# Patient Record
Sex: FEMALE | Race: BLACK OR AFRICAN AMERICAN | NOT HISPANIC OR LATINO | URBAN - METROPOLITAN AREA
[De-identification: names, ages, dates, MRNs, and addresses within clinical notes are randomized per-mention and may not be internally consistent; named-entity substitution may affect disease eponyms.]

---

## 2019-08-21 ENCOUNTER — OUTPATIENT (OUTPATIENT)
Dept: OUTPATIENT SERVICES | Facility: HOSPITAL | Age: 49
LOS: 1 days | Discharge: HOME | End: 2019-08-21
Payer: OTHER MISCELLANEOUS

## 2019-08-21 VITALS
WEIGHT: 203.93 LBS | TEMPERATURE: 98 F | SYSTOLIC BLOOD PRESSURE: 121 MMHG | HEART RATE: 79 BPM | DIASTOLIC BLOOD PRESSURE: 73 MMHG | RESPIRATION RATE: 16 BRPM | OXYGEN SATURATION: 98 % | HEIGHT: 69 IN

## 2019-08-21 DIAGNOSIS — Z01.818 ENCOUNTER FOR OTHER PREPROCEDURAL EXAMINATION: ICD-10-CM

## 2019-08-21 DIAGNOSIS — Z98.890 OTHER SPECIFIED POSTPROCEDURAL STATES: Chronic | ICD-10-CM

## 2019-08-21 DIAGNOSIS — M16.0 BILATERAL PRIMARY OSTEOARTHRITIS OF HIP: ICD-10-CM

## 2019-08-21 LAB
ANION GAP SERPL CALC-SCNC: 8 MMOL/L — SIGNIFICANT CHANGE UP (ref 5–17)
APTT BLD: 28.6 SEC — SIGNIFICANT CHANGE UP (ref 27.5–36.3)
BASOPHILS # BLD AUTO: 0.02 K/UL — SIGNIFICANT CHANGE UP (ref 0–0.2)
BASOPHILS NFR BLD AUTO: 0.3 % — SIGNIFICANT CHANGE UP (ref 0–2)
BUN SERPL-MCNC: 7 MG/DL — SIGNIFICANT CHANGE UP (ref 7–23)
CALCIUM SERPL-MCNC: 8.9 MG/DL — SIGNIFICANT CHANGE UP (ref 8.5–10.1)
CHLORIDE SERPL-SCNC: 107 MMOL/L — SIGNIFICANT CHANGE UP (ref 96–108)
CO2 SERPL-SCNC: 27 MMOL/L — SIGNIFICANT CHANGE UP (ref 22–31)
CREAT SERPL-MCNC: 0.89 MG/DL — SIGNIFICANT CHANGE UP (ref 0.5–1.3)
EOSINOPHIL # BLD AUTO: 0.11 K/UL — SIGNIFICANT CHANGE UP (ref 0–0.5)
EOSINOPHIL NFR BLD AUTO: 1.6 % — SIGNIFICANT CHANGE UP (ref 0–6)
GLUCOSE SERPL-MCNC: 93 MG/DL — SIGNIFICANT CHANGE UP (ref 70–99)
HBA1C BLD-MCNC: 5.8 % — HIGH (ref 4–5.6)
HCG UR QL: NEGATIVE — SIGNIFICANT CHANGE UP
HCT VFR BLD CALC: 43.3 % — SIGNIFICANT CHANGE UP (ref 34.5–45)
HGB BLD-MCNC: 13.6 G/DL — SIGNIFICANT CHANGE UP (ref 11.5–15.5)
IMM GRANULOCYTES NFR BLD AUTO: 0.3 % — SIGNIFICANT CHANGE UP (ref 0–1.5)
LYMPHOCYTES # BLD AUTO: 2.16 K/UL — SIGNIFICANT CHANGE UP (ref 1–3.3)
LYMPHOCYTES # BLD AUTO: 31.7 % — SIGNIFICANT CHANGE UP (ref 13–44)
MCHC RBC-ENTMCNC: 25.9 PG — LOW (ref 27–34)
MCHC RBC-ENTMCNC: 31.4 GM/DL — LOW (ref 32–36)
MCV RBC AUTO: 82.3 FL — SIGNIFICANT CHANGE UP (ref 80–100)
MONOCYTES # BLD AUTO: 0.49 K/UL — SIGNIFICANT CHANGE UP (ref 0–0.9)
MONOCYTES NFR BLD AUTO: 7.2 % — SIGNIFICANT CHANGE UP (ref 2–14)
MRSA PCR RESULT.: SIGNIFICANT CHANGE UP
NEUTROPHILS # BLD AUTO: 4.01 K/UL — SIGNIFICANT CHANGE UP (ref 1.8–7.4)
NEUTROPHILS NFR BLD AUTO: 58.9 % — SIGNIFICANT CHANGE UP (ref 43–77)
NRBC # BLD: 0 /100 WBCS — SIGNIFICANT CHANGE UP (ref 0–0)
PLATELET # BLD AUTO: 256 K/UL — SIGNIFICANT CHANGE UP (ref 150–400)
POTASSIUM SERPL-MCNC: 3.7 MMOL/L — SIGNIFICANT CHANGE UP (ref 3.5–5.3)
POTASSIUM SERPL-SCNC: 3.7 MMOL/L — SIGNIFICANT CHANGE UP (ref 3.5–5.3)
RBC # BLD: 5.26 M/UL — HIGH (ref 3.8–5.2)
RBC # FLD: 15.2 % — HIGH (ref 10.3–14.5)
S AUREUS DNA NOSE QL NAA+PROBE: SIGNIFICANT CHANGE UP
SODIUM SERPL-SCNC: 142 MMOL/L — SIGNIFICANT CHANGE UP (ref 135–145)
WBC # BLD: 6.81 K/UL — SIGNIFICANT CHANGE UP (ref 3.8–10.5)
WBC # FLD AUTO: 6.81 K/UL — SIGNIFICANT CHANGE UP (ref 3.8–10.5)

## 2019-08-21 PROCEDURE — 93010 ELECTROCARDIOGRAM REPORT: CPT

## 2019-08-21 NOTE — H&P PST ADULT - NSANTHOSAYNRD_GEN_A_CORE
No. YADIEL screening performed.  STOP BANG Legend: 0-2 = LOW Risk; 3-4 = INTERMEDIATE Risk; 5-8 = HIGH Risk

## 2019-08-21 NOTE — H&P PST ADULT - NSICDXPROBLEM_GEN_ALL_CORE_FT
PROBLEM DIAGNOSES  Problem: Osteoarthritis  Assessment and Plan: scheduled for left hip arthroplasty    Problem: COPD, mild  Assessment and Plan: continue meds    Problem: Bipolar disorder  Assessment and Plan: continue meds

## 2019-08-21 NOTE — H&P PST ADULT - ATTENDING COMMENTS
23-May-2017 00:08
Pt has advanced OA on X-ray.  They have failed all forms of conservative treatment including PT and Meds and Injections as necessary.  They are her for Total Joint Replacement.

## 2019-08-21 NOTE — H&P PST ADULT - ASSESSMENT
osteoarthritis left hip  CAPRINI SCORE    AGE RELATED RISK FACTORS                                                       MOBILITY RELATED FACTORS  [ x] Age 41-60 years                                            (1 Point)                  [ ] Bed rest                                                        (1 Point)  [ ] Age: 61-74 years                                           (2 Points)                [ ] Plaster cast                                                   (2 Points)  [ ] Age= 75 years                                              (3 Points)                 [ ] Bed bound for more than 72 hours                   (2 Points)    DISEASE RELATED RISK FACTORS                                               GENDER SPECIFIC FACTORS  [ ] Edema in the lower extremities                       (1 Point)                  [ ] Pregnancy                                                     (1 Point)  [ ] Varicose veins                                               (1 Point)                  [ ] Post-partum < 6 weeks                                   (1 Point)             [x ] BMI > 25 Kg/m2                                            (1 Point)                  [ ] Hormonal therapy  or oral contraception            (1 Point)                 [ ] Sepsis (in the previous month)                        (1 Point)                  [ ] History of pregnancy complications  [ ] Pneumonia or serious lung disease                                               [ ] Unexplained or recurrent                       (1 Point)           (in the previous month)                               (1 Point)  [ ] Abnormal pulmonary function test                     (1 Point)                 SURGERY RELATED RISK FACTORS  [ ] Acute myocardial infarction                              (1 Point)                 [ ]  Section                                            (1 Point)  [ ] Congestive heart failure (in the previous month)  (1 Point)                 [ ] Minor surgery                                                 (1 Point)   [ ] Inflammatory bowel disease                             (1 Point)                 [ ] Arthroscopic surgery                                        (2 Points)  [ ] Central venous access                                    (2 Points)                [ ] General surgery lasting more than 45 minutes   (2 Points)       [ ] Stroke (in the previous month)                          (5 Points)               [x ] Elective arthroplasty                                        (5 Points)                                                                                                                                               HEMATOLOGY RELATED FACTORS                                                 TRAUMA RELATED RISK FACTORS  [ ] Prior episodes of VTE                                     (3 Points)                 [ ] Fracture of the hip, pelvis, or leg                       (5 Points)  [ ] Positive family history for VTE                         (3 Points)                 [ ] Acute spinal cord injury (in the previous month)  (5 Points)  [ ] Prothrombin 99030 A                                      (3 Points)                 [ ] Paralysis  (less than 1 month)                          (5 Points)  [ ] Factor V Leiden                                             (3 Points)                 [ ] Multiple Trauma within 1 month                         (5 Points)  [ ] Lupus anticoagulants                                     (3 Points)                                                           [ ] Anticardiolipin antibodies                                (3 Points)                                                       [ ] High homocysteine in the blood                      (3 Points)                                             [ ] Other congenital or acquired thrombophilia       (3 Points)                                                [ ] Heparin induced thrombocytopenia                  (3 Points)                                          Total Score [      7    ]

## 2019-08-21 NOTE — PHYSICAL THERAPY INITIAL EVALUATION ADULT - ADDITIONAL COMMENTS
Pt lives with her parents (both of which are retired, + assist post-op) in a private home, 4 entry steps (+ B/L rails to enter, close enough to reach simultaneously), 15 steps inside (+ L rail for ascent). Pt has a tub/shower combo, no grab bars, fixe shower head, & standard toilet seat height. Pt has 3:1 commode at home that fits in bathroom (she currently uses it). Pt performs all functional mobility independently including community ambulation. Pt occasionally uses straight cane as needed. Pt has rolling walker, rollator, SAC x 2, & 3:1 commode --> all are in good working condition & easily accessible. Pt states pain is 7/10 at all times. Pt avoids wearing socks & shoes due to difficulty with ADL's but endorses she completes them independently. Pt takes muscle relaxant for pain management. Pt is right hand dominant, drives, & is a retired . Pt s/p R CLARIBEL in 2016.

## 2019-08-21 NOTE — H&P PST ADULT - HISTORY OF PRESENT ILLNESS
49 yo female PMH- bipolar, gerd - c/o left hip pain secondary to osteoarthritis scheduled for left hip arthroplasty 49 yo female PMH- bipolar, gerd - c/o left hip pain secondary to osteoarthritis scheduled for left hip arthroplasty. goal is to be able to travel to visit her family

## 2019-08-22 DIAGNOSIS — J44.9 CHRONIC OBSTRUCTIVE PULMONARY DISEASE, UNSPECIFIED: ICD-10-CM

## 2019-08-22 DIAGNOSIS — M19.90 UNSPECIFIED OSTEOARTHRITIS, UNSPECIFIED SITE: ICD-10-CM

## 2019-08-22 DIAGNOSIS — F31.9 BIPOLAR DISORDER, UNSPECIFIED: ICD-10-CM

## 2019-09-10 ENCOUNTER — TRANSCRIPTION ENCOUNTER (OUTPATIENT)
Age: 49
End: 2019-09-10

## 2019-09-11 ENCOUNTER — RESULT REVIEW (OUTPATIENT)
Age: 49
End: 2019-09-11

## 2019-09-11 ENCOUNTER — TRANSCRIPTION ENCOUNTER (OUTPATIENT)
Age: 49
End: 2019-09-11

## 2019-09-11 ENCOUNTER — INPATIENT (INPATIENT)
Facility: HOSPITAL | Age: 49
LOS: 0 days | Discharge: HOME HEALTH | End: 2019-09-12
Attending: ORTHOPAEDIC SURGERY | Admitting: ORTHOPAEDIC SURGERY
Payer: OTHER MISCELLANEOUS

## 2019-09-11 VITALS
OXYGEN SATURATION: 97 % | DIASTOLIC BLOOD PRESSURE: 75 MMHG | TEMPERATURE: 98 F | RESPIRATION RATE: 18 BRPM | SYSTOLIC BLOOD PRESSURE: 134 MMHG | HEART RATE: 81 BPM | HEIGHT: 69 IN | WEIGHT: 203.93 LBS

## 2019-09-11 DIAGNOSIS — Z98.890 OTHER SPECIFIED POSTPROCEDURAL STATES: Chronic | ICD-10-CM

## 2019-09-11 LAB
ANION GAP SERPL CALC-SCNC: 5 MMOL/L — SIGNIFICANT CHANGE UP (ref 5–17)
BUN SERPL-MCNC: 11 MG/DL — SIGNIFICANT CHANGE UP (ref 7–23)
CALCIUM SERPL-MCNC: 8.3 MG/DL — LOW (ref 8.5–10.1)
CHLORIDE SERPL-SCNC: 108 MMOL/L — SIGNIFICANT CHANGE UP (ref 96–108)
CO2 SERPL-SCNC: 26 MMOL/L — SIGNIFICANT CHANGE UP (ref 22–31)
CREAT SERPL-MCNC: 0.88 MG/DL — SIGNIFICANT CHANGE UP (ref 0.5–1.3)
GLUCOSE SERPL-MCNC: 102 MG/DL — HIGH (ref 70–99)
HCG UR QL: NEGATIVE — SIGNIFICANT CHANGE UP
HCT VFR BLD CALC: 39.1 % — SIGNIFICANT CHANGE UP (ref 34.5–45)
HGB BLD-MCNC: 12.4 G/DL — SIGNIFICANT CHANGE UP (ref 11.5–15.5)
MCHC RBC-ENTMCNC: 26.3 PG — LOW (ref 27–34)
MCHC RBC-ENTMCNC: 31.7 GM/DL — LOW (ref 32–36)
MCV RBC AUTO: 83 FL — SIGNIFICANT CHANGE UP (ref 80–100)
NRBC # BLD: 0 /100 WBCS — SIGNIFICANT CHANGE UP (ref 0–0)
PLATELET # BLD AUTO: 238 K/UL — SIGNIFICANT CHANGE UP (ref 150–400)
POTASSIUM SERPL-MCNC: 4.7 MMOL/L — SIGNIFICANT CHANGE UP (ref 3.5–5.3)
POTASSIUM SERPL-SCNC: 4.7 MMOL/L — SIGNIFICANT CHANGE UP (ref 3.5–5.3)
RBC # BLD: 4.71 M/UL — SIGNIFICANT CHANGE UP (ref 3.8–5.2)
RBC # FLD: 15.1 % — HIGH (ref 10.3–14.5)
SODIUM SERPL-SCNC: 139 MMOL/L — SIGNIFICANT CHANGE UP (ref 135–145)
WBC # BLD: 11.52 K/UL — HIGH (ref 3.8–10.5)
WBC # FLD AUTO: 11.52 K/UL — HIGH (ref 3.8–10.5)

## 2019-09-11 PROCEDURE — 72170 X-RAY EXAM OF PELVIS: CPT | Mod: 26

## 2019-09-11 PROCEDURE — 88305 TISSUE EXAM BY PATHOLOGIST: CPT | Mod: 26

## 2019-09-11 PROCEDURE — 88311 DECALCIFY TISSUE: CPT | Mod: 26

## 2019-09-11 RX ORDER — ALBUTEROL 90 UG/1
2 AEROSOL, METERED ORAL EVERY 6 HOURS
Refills: 0 | Status: DISCONTINUED | OUTPATIENT
Start: 2019-09-11 | End: 2019-09-12

## 2019-09-11 RX ORDER — HYDROMORPHONE HYDROCHLORIDE 2 MG/ML
4 INJECTION INTRAMUSCULAR; INTRAVENOUS; SUBCUTANEOUS
Refills: 0 | Status: DISCONTINUED | OUTPATIENT
Start: 2019-09-11 | End: 2019-09-12

## 2019-09-11 RX ORDER — PANTOPRAZOLE SODIUM 20 MG/1
40 TABLET, DELAYED RELEASE ORAL
Refills: 0 | Status: DISCONTINUED | OUTPATIENT
Start: 2019-09-11 | End: 2019-09-12

## 2019-09-11 RX ORDER — CLONAZEPAM 1 MG
1 TABLET ORAL
Qty: 0 | Refills: 0 | DISCHARGE

## 2019-09-11 RX ORDER — ARIPIPRAZOLE 15 MG/1
1 TABLET ORAL
Qty: 0 | Refills: 0 | DISCHARGE

## 2019-09-11 RX ORDER — CELECOXIB 200 MG/1
200 CAPSULE ORAL
Refills: 0 | Status: DISCONTINUED | OUTPATIENT
Start: 2019-09-12 | End: 2019-09-12

## 2019-09-11 RX ORDER — FOLIC ACID 0.8 MG
1 TABLET ORAL DAILY
Refills: 0 | Status: DISCONTINUED | OUTPATIENT
Start: 2019-09-11 | End: 2019-09-12

## 2019-09-11 RX ORDER — FENTANYL CITRATE 50 UG/ML
25 INJECTION INTRAVENOUS
Refills: 0 | Status: DISCONTINUED | OUTPATIENT
Start: 2019-09-11 | End: 2019-09-11

## 2019-09-11 RX ORDER — ARIPIPRAZOLE 15 MG/1
2 TABLET ORAL DAILY
Refills: 0 | Status: DISCONTINUED | OUTPATIENT
Start: 2019-09-11 | End: 2019-09-12

## 2019-09-11 RX ORDER — FENTANYL CITRATE 50 UG/ML
50 INJECTION INTRAVENOUS
Refills: 0 | Status: DISCONTINUED | OUTPATIENT
Start: 2019-09-11 | End: 2019-09-11

## 2019-09-11 RX ORDER — BUDESONIDE AND FORMOTEROL FUMARATE DIHYDRATE 160; 4.5 UG/1; UG/1
2 AEROSOL RESPIRATORY (INHALATION)
Qty: 0 | Refills: 0 | DISCHARGE

## 2019-09-11 RX ORDER — ASPIRIN/CALCIUM CARB/MAGNESIUM 324 MG
325 TABLET ORAL
Refills: 0 | Status: DISCONTINUED | OUTPATIENT
Start: 2019-09-12 | End: 2019-09-12

## 2019-09-11 RX ORDER — POLYETHYLENE GLYCOL 3350 17 G/17G
17 POWDER, FOR SOLUTION ORAL DAILY
Refills: 0 | Status: DISCONTINUED | OUTPATIENT
Start: 2019-09-11 | End: 2019-09-12

## 2019-09-11 RX ORDER — FERROUS SULFATE 325(65) MG
325 TABLET ORAL
Refills: 0 | Status: DISCONTINUED | OUTPATIENT
Start: 2019-09-11 | End: 2019-09-12

## 2019-09-11 RX ORDER — SODIUM CHLORIDE 9 MG/ML
1000 INJECTION, SOLUTION INTRAVENOUS
Refills: 0 | Status: DISCONTINUED | OUTPATIENT
Start: 2019-09-11 | End: 2019-09-11

## 2019-09-11 RX ORDER — HYDROMORPHONE HYDROCHLORIDE 2 MG/ML
2 INJECTION INTRAMUSCULAR; INTRAVENOUS; SUBCUTANEOUS EVERY 4 HOURS
Refills: 0 | Status: DISCONTINUED | OUTPATIENT
Start: 2019-09-11 | End: 2019-09-11

## 2019-09-11 RX ORDER — ACETAMINOPHEN 500 MG
650 TABLET ORAL ONCE
Refills: 0 | Status: COMPLETED | OUTPATIENT
Start: 2019-09-11 | End: 2019-09-11

## 2019-09-11 RX ORDER — SENNA PLUS 8.6 MG/1
2 TABLET ORAL AT BEDTIME
Refills: 0 | Status: DISCONTINUED | OUTPATIENT
Start: 2019-09-11 | End: 2019-09-12

## 2019-09-11 RX ORDER — LAMOTRIGINE 25 MG/1
0 TABLET, ORALLY DISINTEGRATING ORAL
Qty: 0 | Refills: 0 | DISCHARGE

## 2019-09-11 RX ORDER — CEFAZOLIN SODIUM 1 G
2000 VIAL (EA) INJECTION EVERY 8 HOURS
Refills: 0 | Status: COMPLETED | OUTPATIENT
Start: 2019-09-11 | End: 2019-09-11

## 2019-09-11 RX ORDER — BUPROPION HYDROCHLORIDE 150 MG/1
1 TABLET, EXTENDED RELEASE ORAL
Qty: 0 | Refills: 0 | DISCHARGE

## 2019-09-11 RX ORDER — DOCUSATE SODIUM 100 MG
100 CAPSULE ORAL THREE TIMES A DAY
Refills: 0 | Status: DISCONTINUED | OUTPATIENT
Start: 2019-09-11 | End: 2019-09-12

## 2019-09-11 RX ORDER — BUDESONIDE AND FORMOTEROL FUMARATE DIHYDRATE 160; 4.5 UG/1; UG/1
2 AEROSOL RESPIRATORY (INHALATION)
Refills: 0 | Status: DISCONTINUED | OUTPATIENT
Start: 2019-09-11 | End: 2019-09-12

## 2019-09-11 RX ORDER — LAMOTRIGINE 25 MG/1
1 TABLET, ORALLY DISINTEGRATING ORAL
Qty: 0 | Refills: 0 | DISCHARGE

## 2019-09-11 RX ORDER — TRANEXAMIC ACID 100 MG/ML
1000 INJECTION, SOLUTION INTRAVENOUS ONCE
Refills: 0 | Status: COMPLETED | OUTPATIENT
Start: 2019-09-11 | End: 2019-09-11

## 2019-09-11 RX ORDER — HYDROMORPHONE HYDROCHLORIDE 2 MG/ML
2 INJECTION INTRAMUSCULAR; INTRAVENOUS; SUBCUTANEOUS
Refills: 0 | Status: DISCONTINUED | OUTPATIENT
Start: 2019-09-11 | End: 2019-09-12

## 2019-09-11 RX ORDER — CLONAZEPAM 1 MG
0.5 TABLET ORAL AT BEDTIME
Refills: 0 | Status: DISCONTINUED | OUTPATIENT
Start: 2019-09-11 | End: 2019-09-12

## 2019-09-11 RX ORDER — ZOLPIDEM TARTRATE 10 MG/1
5 TABLET ORAL AT BEDTIME
Refills: 0 | Status: DISCONTINUED | OUTPATIENT
Start: 2019-09-11 | End: 2019-09-12

## 2019-09-11 RX ORDER — DEXAMETHASONE 0.5 MG/5ML
10 ELIXIR ORAL ONCE
Refills: 0 | Status: COMPLETED | OUTPATIENT
Start: 2019-09-12 | End: 2019-09-12

## 2019-09-11 RX ORDER — SODIUM CHLORIDE 9 MG/ML
1000 INJECTION, SOLUTION INTRAVENOUS
Refills: 0 | Status: DISCONTINUED | OUTPATIENT
Start: 2019-09-11 | End: 2019-09-12

## 2019-09-11 RX ORDER — ASCORBIC ACID 60 MG
500 TABLET,CHEWABLE ORAL
Refills: 0 | Status: DISCONTINUED | OUTPATIENT
Start: 2019-09-11 | End: 2019-09-12

## 2019-09-11 RX ORDER — HYDROMORPHONE HYDROCHLORIDE 2 MG/ML
4 INJECTION INTRAMUSCULAR; INTRAVENOUS; SUBCUTANEOUS EVERY 4 HOURS
Refills: 0 | Status: DISCONTINUED | OUTPATIENT
Start: 2019-09-11 | End: 2019-09-11

## 2019-09-11 RX ORDER — ACETAMINOPHEN 500 MG
975 TABLET ORAL EVERY 8 HOURS
Refills: 0 | Status: DISCONTINUED | OUTPATIENT
Start: 2019-09-11 | End: 2019-09-12

## 2019-09-11 RX ORDER — LAMOTRIGINE 25 MG/1
25 TABLET, ORALLY DISINTEGRATING ORAL AT BEDTIME
Refills: 0 | Status: DISCONTINUED | OUTPATIENT
Start: 2019-09-11 | End: 2019-09-12

## 2019-09-11 RX ORDER — OXYCODONE HYDROCHLORIDE 5 MG/1
10 TABLET ORAL ONCE
Refills: 0 | Status: DISCONTINUED | OUTPATIENT
Start: 2019-09-11 | End: 2019-09-11

## 2019-09-11 RX ORDER — ALBUTEROL 90 UG/1
1.25 AEROSOL, METERED ORAL ONCE
Refills: 0 | Status: COMPLETED | OUTPATIENT
Start: 2019-09-11 | End: 2019-09-11

## 2019-09-11 RX ORDER — BUPROPION HYDROCHLORIDE 150 MG/1
300 TABLET, EXTENDED RELEASE ORAL DAILY
Refills: 0 | Status: DISCONTINUED | OUTPATIENT
Start: 2019-09-11 | End: 2019-09-12

## 2019-09-11 RX ORDER — ALBUTEROL 90 UG/1
2 AEROSOL, METERED ORAL
Qty: 0 | Refills: 0 | DISCHARGE

## 2019-09-11 RX ORDER — HYDROMORPHONE HYDROCHLORIDE 2 MG/ML
1 INJECTION INTRAMUSCULAR; INTRAVENOUS; SUBCUTANEOUS EVERY 4 HOURS
Refills: 0 | Status: DISCONTINUED | OUTPATIENT
Start: 2019-09-11 | End: 2019-09-12

## 2019-09-11 RX ORDER — ONDANSETRON 8 MG/1
4 TABLET, FILM COATED ORAL EVERY 6 HOURS
Refills: 0 | Status: DISCONTINUED | OUTPATIENT
Start: 2019-09-11 | End: 2019-09-12

## 2019-09-11 RX ADMIN — HYDROMORPHONE HYDROCHLORIDE 4 MILLIGRAM(S): 2 INJECTION INTRAMUSCULAR; INTRAVENOUS; SUBCUTANEOUS at 17:05

## 2019-09-11 RX ADMIN — HYDROMORPHONE HYDROCHLORIDE 1 MILLIGRAM(S): 2 INJECTION INTRAMUSCULAR; INTRAVENOUS; SUBCUTANEOUS at 14:18

## 2019-09-11 RX ADMIN — Medication 325 MILLIGRAM(S): at 11:48

## 2019-09-11 RX ADMIN — HYDROMORPHONE HYDROCHLORIDE 1 MILLIGRAM(S): 2 INJECTION INTRAMUSCULAR; INTRAVENOUS; SUBCUTANEOUS at 20:01

## 2019-09-11 RX ADMIN — Medication 100 MILLIGRAM(S): at 15:32

## 2019-09-11 RX ADMIN — TRANEXAMIC ACID 220 MILLIGRAM(S): 100 INJECTION, SOLUTION INTRAVENOUS at 09:37

## 2019-09-11 RX ADMIN — HYDROMORPHONE HYDROCHLORIDE 4 MILLIGRAM(S): 2 INJECTION INTRAMUSCULAR; INTRAVENOUS; SUBCUTANEOUS at 11:48

## 2019-09-11 RX ADMIN — ALBUTEROL 1.25 MILLIGRAM(S): 90 AEROSOL, METERED ORAL at 09:36

## 2019-09-11 RX ADMIN — Medication 650 MILLIGRAM(S): at 07:09

## 2019-09-11 RX ADMIN — Medication 975 MILLIGRAM(S): at 22:20

## 2019-09-11 RX ADMIN — SODIUM CHLORIDE 100 MILLILITER(S): 9 INJECTION, SOLUTION INTRAVENOUS at 09:37

## 2019-09-11 RX ADMIN — Medication 100 MILLIGRAM(S): at 14:18

## 2019-09-11 RX ADMIN — LAMOTRIGINE 25 MILLIGRAM(S): 25 TABLET, ORALLY DISINTEGRATING ORAL at 22:27

## 2019-09-11 RX ADMIN — BUPROPION HYDROCHLORIDE 300 MILLIGRAM(S): 150 TABLET, EXTENDED RELEASE ORAL at 11:48

## 2019-09-11 RX ADMIN — SODIUM CHLORIDE 150 MILLILITER(S): 9 INJECTION, SOLUTION INTRAVENOUS at 11:00

## 2019-09-11 RX ADMIN — Medication 1 MILLIGRAM(S): at 11:48

## 2019-09-11 RX ADMIN — POLYETHYLENE GLYCOL 3350 17 GRAM(S): 17 POWDER, FOR SOLUTION ORAL at 11:48

## 2019-09-11 RX ADMIN — Medication 325 MILLIGRAM(S): at 17:05

## 2019-09-11 RX ADMIN — ARIPIPRAZOLE 2 MILLIGRAM(S): 15 TABLET ORAL at 11:48

## 2019-09-11 RX ADMIN — SODIUM CHLORIDE 150 MILLILITER(S): 9 INJECTION, SOLUTION INTRAVENOUS at 19:55

## 2019-09-11 RX ADMIN — Medication 975 MILLIGRAM(S): at 15:18

## 2019-09-11 RX ADMIN — HYDROMORPHONE HYDROCHLORIDE 1 MILLIGRAM(S): 2 INJECTION INTRAMUSCULAR; INTRAVENOUS; SUBCUTANEOUS at 14:33

## 2019-09-11 RX ADMIN — Medication 100 MILLIGRAM(S): at 21:23

## 2019-09-11 RX ADMIN — Medication 0.5 MILLIGRAM(S): at 21:23

## 2019-09-11 RX ADMIN — HYDROMORPHONE HYDROCHLORIDE 4 MILLIGRAM(S): 2 INJECTION INTRAMUSCULAR; INTRAVENOUS; SUBCUTANEOUS at 12:48

## 2019-09-11 RX ADMIN — Medication 975 MILLIGRAM(S): at 21:23

## 2019-09-11 RX ADMIN — Medication 1 TABLET(S): at 11:48

## 2019-09-11 RX ADMIN — HYDROMORPHONE HYDROCHLORIDE 1 MILLIGRAM(S): 2 INJECTION INTRAMUSCULAR; INTRAVENOUS; SUBCUTANEOUS at 20:16

## 2019-09-11 RX ADMIN — BUDESONIDE AND FORMOTEROL FUMARATE DIHYDRATE 2 PUFF(S): 160; 4.5 AEROSOL RESPIRATORY (INHALATION) at 17:07

## 2019-09-11 RX ADMIN — Medication 975 MILLIGRAM(S): at 14:20

## 2019-09-11 RX ADMIN — Medication 100 MILLIGRAM(S): at 23:36

## 2019-09-11 RX ADMIN — Medication 500 MILLIGRAM(S): at 17:05

## 2019-09-11 RX ADMIN — HYDROMORPHONE HYDROCHLORIDE 4 MILLIGRAM(S): 2 INJECTION INTRAMUSCULAR; INTRAVENOUS; SUBCUTANEOUS at 18:05

## 2019-09-11 NOTE — OCCUPATIONAL THERAPY INITIAL EVALUATION ADULT - STRENGTHENING, PT EVAL
Patient will increase left hip flexion/extension to WFL to increase performance with ADL's while following hip precautions in 1-2 weeks.

## 2019-09-11 NOTE — CONSULT NOTE ADULT - SUBJECTIVE AND OBJECTIVE BOX
JOHNATHON VEGAS is a 49y Female s/p LEFT TOTAL HIP ARTHROPLASTY  by Dr. Jeffrey on 9/11/10. complains of postop pain; patient tolerated surgery well.    PMH:  w/ h/o Asthma  COPD, mild  Anxiety and depression  Bipolar disorder  Chronic GERD    ROS: no fevers, chills, headache, dizziness, lightheadedness, chest pain, palpitations, shortness of breath, cough, phlegm, wheezing, abdominal pain, nausea, vomiting, diarrhea, constipation or urinary symptoms     History of arthroplasty of right hip    SH: does not smoke or drink at this time    ALLERGIES:  IV Contrast (Short breath)  Motrin (Other)  oxycodone (Pruritus)  penicillin (Unknown)  Zithromax (Short breath)    MEDS:  acetaminophen   Tablet .. 975 milliGRAM(s) Oral every 8 hours  ALBUTerol    90 MICROgram(s) HFA Inhaler 2 Puff(s) Inhalation every 6 hours PRN  aluminum hydroxide/magnesium hydroxide/simethicone Suspension 30 milliLiter(s) Oral four times a day PRN  ARIPiprazole 2 milliGRAM(s) Oral daily  ascorbic acid 500 milliGRAM(s) Oral two times a day  buDESOnide  80 MICROgram(s)/formoterol 4.5 MICROgram(s) Inhaler 2 Puff(s) Inhalation two times a day  buPROPion XL . 300 milliGRAM(s) Oral daily  ceFAZolin   IVPB 2000 milliGRAM(s) IV Intermittent every 8 hours  clonazePAM  Tablet 0.5 milliGRAM(s) Oral at bedtime  docusate sodium 100 milliGRAM(s) Oral three times a day  ferrous    sulfate 325 milliGRAM(s) Oral three times a day with meals  folic acid 1 milliGRAM(s) Oral daily  HYDROmorphone   Tablet 2 milliGRAM(s) Oral every 3 hours PRN  HYDROmorphone   Tablet 4 milliGRAM(s) Oral every 3 hours PRN  HYDROmorphone  Injectable 1 milliGRAM(s) IV Push every 4 hours PRN  lactated ringers. 1000 milliLiter(s) IV Continuous <Continuous>  lamoTRIgine 25 milliGRAM(s) Oral at bedtime  multivitamin 1 Tablet(s) Oral daily  ondansetron Injectable 4 milliGRAM(s) IV Push every 6 hours PRN  pantoprazole    Tablet 40 milliGRAM(s) Oral before breakfast  polyethylene glycol 3350 17 Gram(s) Oral daily  senna 2 Tablet(s) Oral at bedtime PRN  zolpidem 5 milliGRAM(s) Oral at bedtime PRN  zolpidem 5 milliGRAM(s) Oral at bedtime PRN    PHYS:  T(C): 36.4 (09-11-19 @ 18:09), Max: 36.9 (09-11-19 @ 06:49)  HR: 69 (09-11-19 @ 18:09) (57 - 89)  BP: 120/58 (09-11-19 @ 18:09) (101/54 - 151/73)  RR: 16 (09-11-19 @ 18:09) (12 - 20)  SpO2: 98% (09-11-19 @ 18:09) (97% - 100%)  HEENT unremarkable  neck no JVD or bruit  lungs, clear bilaterally  heart, regular rhythm, normal S1, S2, no murmurs, rubs or gallops  abdomen, soft, non tender, no organomegaly, normal bowel sounds  no cyanosis, clubbing, edema or calf tenderness  neuro, grossly normal              12.4   11.52 )-----------( 238      ( 11 Sep 2019 09:44 )             39.1   09-11    139  |  108  |  11  ----------------------------<  102<H>  4.7   |  26  |  0.88    Ca    8.3<L>      11 Sep 2019 09:45    Assessment and Plan: status post left total hip replacement; Gastroesophageal reflux disease; postop leucocytosis; chronic obstructive pulmonary disease; asthma; depression/anxiety; bipolar disorder; pain control; deep vein thrombophlebitis prophylaxis; physical therapy; bowel regimen; nutrition support; follow up labs; will follow.

## 2019-09-11 NOTE — OCCUPATIONAL THERAPY INITIAL EVALUATION ADULT - NS ASR OT EQUIP NEEDS DISCH
patient reports she has rolling walker & 3:1 commode. Pt was provided a hip kit at bedside, patient reports she has rolling walker & 3:1 commode. Pt was provided a hip kit at bedside.

## 2019-09-11 NOTE — DISCHARGE NOTE PROVIDER - NSDCFUADDAPPT_GEN_ALL_CORE_FT
Follow up with your surgeon in two weeks. Call for appointment.  If you need more pain medication, call your surgeon's office. For medication refills or authorizations, please call [phone]  Call and schedule a follow up appointment with your primary care physician for repeat blood work (CBC and BMP) for post hospital discharge follow-up care.  Call your surgeon if you have increased redness/pain/drainage or fever. Return to ER for shortness of breath/calf tenderness.  Per Dr. Jeffrey: may advance from walker as tolerated per discretion of physical therapist.

## 2019-09-11 NOTE — PHYSICAL THERAPY INITIAL EVALUATION ADULT - ADDITIONAL COMMENTS
Confirmed post-op: Pt lives with her parents (both of which are retired, + assist post-op) in a private home, 4 entry steps (+ B/L rails to enter, close enough to reach simultaneously), 15 steps inside (+ L rail for ascent). Pt has a tub/shower combo, no grab bars, fixe shower head, & standard toilet seat height. Pt has 3:1 commode at home that fits in bathroom (she currently uses it). Pt performs all functional mobility independently including community ambulation. Pt occasionally uses straight cane as needed. Pt has rolling walker, rollator, SAC x 2, & 3:1 commode --> all are in good working condition & easily accessible.

## 2019-09-11 NOTE — PHYSICAL THERAPY INITIAL EVALUATION ADULT - PASSIVE RANGE OF MOTION EXAMINATION, REHAB EVAL
except no L hip flexion past 90, no left hip adduction past neutral, no left hip internal rotation secondary to hip replacement precautions/no Passive ROM deficits were identified

## 2019-09-11 NOTE — PATIENT PROFILE ADULT - NSPROCHRONICPAINAGGRAVATE_GEN_A_NUR
activity/movement
Airway patent, Nasal mucosa clear. Mouth with normal mucosa. Throat has no vesicles, no oropharyngeal exudates and uvula is midline.

## 2019-09-11 NOTE — OCCUPATIONAL THERAPY INITIAL EVALUATION ADULT - ADDITIONAL COMMENTS
Preop confirmed-Pt lives with her parents (both of which are retired, + assist post-op) in a private home, 4 entry steps (+ B/L rails to enter, close enough to reach simultaneously), 15 steps inside (+ L rail for ascent). Pt has a tub/shower combo, no grab bars, fixe shower head, & standard toilet seat height. Pt has 3:1 commode at home that fits in bathroom (she currently uses it). Pt performs all functional mobility independently including community ambulation. Pt occasionally uses straight cane as needed. Pt has rolling walker, rollator, SAC x 2, & 3:1 commode --> all are in good working condition & easily accessible.

## 2019-09-11 NOTE — DISCHARGE NOTE PROVIDER - CARE PROVIDER_API CALL
Jose Alberto Jeffrey)  Orthopaedic Surgery  [address] [place] [address]  Phone: [phone]  Fax: [phone]  Follow Up Time:

## 2019-09-11 NOTE — DISCHARGE NOTE NURSING/CASE MANAGEMENT/SOCIAL WORK - PATIENT PORTAL LINK FT
You can access the FollowMyHealth Patient Portal offered by [facility] by registering at the following website: [email or web address]. By joining [facility]’s FollowMyHealth portal, you will also be able to view your health information using other applications (apps) compatible with our system.

## 2019-09-11 NOTE — DISCHARGE NOTE PROVIDER - HOSPITAL COURSE
49yFemale with history of left hip osteoarthritis presenting for left total hip arthroplasty Dr. Jose Alberto Jeffrey on 9/11/2019. Risk and benefits of surgery were explained to the patient.The patient understood and agreed to proceed with surgery. Patient underwent the procedure with no intraoperative complications. Pt was brought in stable condition to the PACU. Once stable in PACU, pt was brought to the floor. During hospital stay pt was followed by Medicine, PT/OT and home care during this admission. Pt had an uneventful hospital course. Pt is stable for discharge to home on POD#1

## 2019-09-11 NOTE — PHYSICAL THERAPY INITIAL EVALUATION ADULT - GAIT TRAINING, PT EVAL
Pt will independently ambulate 200feet with rolling walker without loss of balance, by 2-3days. Pt will independently negotiate 6 steps with L ascending handrail step to pattern to enter and exit home, by 2-3 days

## 2019-09-11 NOTE — DISCHARGE NOTE PROVIDER - NSDCACTIVITY_GEN_ALL_CORE
Showering allowed/Bathing allowed/Do not drive or operate machinery/Walking - Outdoors allowed/Stairs allowed/Do not make important decisions/Walking - Indoors allowed/No heavy lifting/straining

## 2019-09-11 NOTE — DISCHARGE NOTE PROVIDER - NSDCCPCAREPLAN_GEN_ALL_CORE_FT
PRINCIPAL DISCHARGE DIAGNOSIS  Diagnosis: Primary localized osteoarthritis of left hip  Assessment and Plan of Treatment:

## 2019-09-11 NOTE — OCCUPATIONAL THERAPY INITIAL EVALUATION ADULT - RANGE OF MOTION EXAMINATION, LOWER EXTREMITY
Right LE Active ROM was WFL   (within functional limits)/LLE hip precautions, decreased left hip flexion AROM grossly

## 2019-09-11 NOTE — PROGRESS NOTE ADULT - SUBJECTIVE AND OBJECTIVE BOX
Patient is seen and examined at bedside. Denies CP/SOB/Dizziness/N/V/D/HA. Pain is not controlled.     Vital Signs Last 24 Hrs  T(C): 36.6 (11 Sep 2019 14:16), Max: 36.9 (11 Sep 2019 06:49)  T(F): 97.8 (11 Sep 2019 14:16), Max: 98.5 (11 Sep 2019 11:45)  HR: 74 (11 Sep 2019 14:02) (57 - 89)  BP: 116/63 (11 Sep 2019 14:02) (101/54 - 151/73)  BP(mean): --  RR: 16 (11 Sep 2019 14:02) (12 - 20)  SpO2: 98% (11 Sep 2019 14:02) (97% - 100%)    GEN: NAD  ABD: soft, NT/ND; no rebound or guarding;  Neurologic: AAOx3; CNS grossly intact; no focal deficits  LLE: Prineo Dressing with scant sanguinous drainage. abduction pillow in place  B/L LE's: Motor intact + EHL/FHL/TA/GS in the BL LE. Sensation is grossly intact B/L. Extremities warm B/L. Compartments soft, compressible B/L, no calf tenderness B/L. DP 2+ B/L.    Labs:                          12.4   11.52 )-----------( 238      ( 11 Sep 2019 09:44 )             39.1       09-11    139  |  108  |  11  ----------------------------<  102<H>  4.7   |  26  |  0.88    Ca    8.3<L>      11 Sep 2019 09:45        A/P: Patient is a 49y y/o Female s/p left total hip arthroplasty, POD # 0  -wound care, isometric exercises, GI motility, new medications, hip precautions,  hospital course and discharge planning reviewed with pt  -Pain control/analgesia: Dilaudid 2/4mg trial.  -Inc spirometry reviewed and counseled  -Venodynes/foot pumps  -F/U AM Labs  -PT/OT/WBAT  -Antibiotic per scips  -Anticoagulation: ASA 325mg BID  -Medical consult: Dr Celestin  -HARINDER plan: Home tomorrow

## 2019-09-11 NOTE — OCCUPATIONAL THERAPY INITIAL EVALUATION ADULT - MANUAL MUSCLE TESTING RESULTS, REHAB EVAL
bilateral UE grossly within functional limits. RLE hip/knee/ankle grossly WFL, LLE decreased hip flexion/extension, knee/ankle WFL grossly

## 2019-09-12 VITALS
TEMPERATURE: 98 F | DIASTOLIC BLOOD PRESSURE: 60 MMHG | OXYGEN SATURATION: 95 % | RESPIRATION RATE: 17 BRPM | SYSTOLIC BLOOD PRESSURE: 120 MMHG | HEART RATE: 63 BPM

## 2019-09-12 LAB
ANION GAP SERPL CALC-SCNC: 7 MMOL/L — SIGNIFICANT CHANGE UP (ref 5–17)
BUN SERPL-MCNC: 9 MG/DL — SIGNIFICANT CHANGE UP (ref 7–23)
CALCIUM SERPL-MCNC: 8.4 MG/DL — LOW (ref 8.5–10.1)
CHLORIDE SERPL-SCNC: 106 MMOL/L — SIGNIFICANT CHANGE UP (ref 96–108)
CO2 SERPL-SCNC: 25 MMOL/L — SIGNIFICANT CHANGE UP (ref 22–31)
CREAT SERPL-MCNC: 0.68 MG/DL — SIGNIFICANT CHANGE UP (ref 0.5–1.3)
GLUCOSE SERPL-MCNC: 117 MG/DL — HIGH (ref 70–99)
HCT VFR BLD CALC: 36.9 % — SIGNIFICANT CHANGE UP (ref 34.5–45)
HGB BLD-MCNC: 11.7 G/DL — SIGNIFICANT CHANGE UP (ref 11.5–15.5)
MCHC RBC-ENTMCNC: 26 PG — LOW (ref 27–34)
MCHC RBC-ENTMCNC: 31.7 GM/DL — LOW (ref 32–36)
MCV RBC AUTO: 82 FL — SIGNIFICANT CHANGE UP (ref 80–100)
NRBC # BLD: 0 /100 WBCS — SIGNIFICANT CHANGE UP (ref 0–0)
PLATELET # BLD AUTO: 191 K/UL — SIGNIFICANT CHANGE UP (ref 150–400)
POTASSIUM SERPL-MCNC: 4.3 MMOL/L — SIGNIFICANT CHANGE UP (ref 3.5–5.3)
POTASSIUM SERPL-SCNC: 4.3 MMOL/L — SIGNIFICANT CHANGE UP (ref 3.5–5.3)
RBC # BLD: 4.5 M/UL — SIGNIFICANT CHANGE UP (ref 3.8–5.2)
RBC # FLD: 15.4 % — HIGH (ref 10.3–14.5)
SODIUM SERPL-SCNC: 138 MMOL/L — SIGNIFICANT CHANGE UP (ref 135–145)
WBC # BLD: 15.12 K/UL — HIGH (ref 3.8–10.5)
WBC # FLD AUTO: 15.12 K/UL — HIGH (ref 3.8–10.5)

## 2019-09-12 PROCEDURE — 99238 HOSP IP/OBS DSCHRG MGMT 30/<: CPT

## 2019-09-12 RX ORDER — POLYETHYLENE GLYCOL 3350 17 G/17G
17 POWDER, FOR SOLUTION ORAL
Qty: 0 | Refills: 0 | DISCHARGE
Start: 2019-09-12

## 2019-09-12 RX ORDER — DOCUSATE SODIUM 100 MG
1 CAPSULE ORAL
Qty: 0 | Refills: 0 | DISCHARGE
Start: 2019-09-12

## 2019-09-12 RX ORDER — ACETAMINOPHEN 500 MG
3 TABLET ORAL
Qty: 0 | Refills: 0 | DISCHARGE
Start: 2019-09-12

## 2019-09-12 RX ORDER — HYDROMORPHONE HYDROCHLORIDE 2 MG/ML
1 INJECTION INTRAMUSCULAR; INTRAVENOUS; SUBCUTANEOUS
Qty: 40 | Refills: 0
Start: 2019-09-12 | End: 2019-09-16

## 2019-09-12 RX ORDER — ASPIRIN/CALCIUM CARB/MAGNESIUM 324 MG
1 TABLET ORAL
Qty: 60 | Refills: 0
Start: 2019-09-12 | End: 2019-10-11

## 2019-09-12 RX ORDER — ASCORBIC ACID 60 MG
1 TABLET,CHEWABLE ORAL
Qty: 0 | Refills: 0 | DISCHARGE
Start: 2019-09-12

## 2019-09-12 RX ORDER — PANTOPRAZOLE SODIUM 20 MG/1
1 TABLET, DELAYED RELEASE ORAL
Qty: 30 | Refills: 0
Start: 2019-09-12 | End: 2019-10-11

## 2019-09-12 RX ORDER — CELECOXIB 200 MG/1
1 CAPSULE ORAL
Qty: 60 | Refills: 0
Start: 2019-09-12 | End: 2019-10-11

## 2019-09-12 RX ORDER — BENZOCAINE AND MENTHOL 5; 1 G/100ML; G/100ML
1 LIQUID ORAL
Refills: 0 | Status: DISCONTINUED | OUTPATIENT
Start: 2019-09-12 | End: 2019-09-12

## 2019-09-12 RX ADMIN — HYDROMORPHONE HYDROCHLORIDE 4 MILLIGRAM(S): 2 INJECTION INTRAMUSCULAR; INTRAVENOUS; SUBCUTANEOUS at 06:30

## 2019-09-12 RX ADMIN — HYDROMORPHONE HYDROCHLORIDE 4 MILLIGRAM(S): 2 INJECTION INTRAMUSCULAR; INTRAVENOUS; SUBCUTANEOUS at 09:33

## 2019-09-12 RX ADMIN — Medication 975 MILLIGRAM(S): at 06:30

## 2019-09-12 RX ADMIN — SODIUM CHLORIDE 150 MILLILITER(S): 9 INJECTION, SOLUTION INTRAVENOUS at 04:19

## 2019-09-12 RX ADMIN — Medication 975 MILLIGRAM(S): at 05:39

## 2019-09-12 RX ADMIN — BUPROPION HYDROCHLORIDE 300 MILLIGRAM(S): 150 TABLET, EXTENDED RELEASE ORAL at 11:55

## 2019-09-12 RX ADMIN — Medication 325 MILLIGRAM(S): at 11:55

## 2019-09-12 RX ADMIN — Medication 500 MILLIGRAM(S): at 05:37

## 2019-09-12 RX ADMIN — HYDROMORPHONE HYDROCHLORIDE 4 MILLIGRAM(S): 2 INJECTION INTRAMUSCULAR; INTRAVENOUS; SUBCUTANEOUS at 10:33

## 2019-09-12 RX ADMIN — Medication 325 MILLIGRAM(S): at 07:46

## 2019-09-12 RX ADMIN — CELECOXIB 200 MILLIGRAM(S): 200 CAPSULE ORAL at 06:30

## 2019-09-12 RX ADMIN — Medication 325 MILLIGRAM(S): at 05:37

## 2019-09-12 RX ADMIN — BENZOCAINE AND MENTHOL 1 LOZENGE: 5; 1 LIQUID ORAL at 06:50

## 2019-09-12 RX ADMIN — BUDESONIDE AND FORMOTEROL FUMARATE DIHYDRATE 2 PUFF(S): 160; 4.5 AEROSOL RESPIRATORY (INHALATION) at 05:37

## 2019-09-12 RX ADMIN — PANTOPRAZOLE SODIUM 40 MILLIGRAM(S): 20 TABLET, DELAYED RELEASE ORAL at 07:46

## 2019-09-12 RX ADMIN — HYDROMORPHONE HYDROCHLORIDE 4 MILLIGRAM(S): 2 INJECTION INTRAMUSCULAR; INTRAVENOUS; SUBCUTANEOUS at 05:37

## 2019-09-12 RX ADMIN — CELECOXIB 200 MILLIGRAM(S): 200 CAPSULE ORAL at 05:39

## 2019-09-12 RX ADMIN — Medication 1 MILLIGRAM(S): at 11:55

## 2019-09-12 RX ADMIN — Medication 102 MILLIGRAM(S): at 05:37

## 2019-09-12 RX ADMIN — Medication 100 MILLIGRAM(S): at 05:37

## 2019-09-12 RX ADMIN — POLYETHYLENE GLYCOL 3350 17 GRAM(S): 17 POWDER, FOR SOLUTION ORAL at 11:55

## 2019-09-12 RX ADMIN — Medication 1 TABLET(S): at 11:55

## 2019-09-12 NOTE — PROGRESS NOTE ADULT - SUBJECTIVE AND OBJECTIVE BOX
JOHNATHON VEGAS is a 49y Female s/p LEFT TOTAL HIP ARTHROPLASTY  by Dr. Jeffrey on 9/11/19. complains of postop pain; patient tolerated surgery well.    ROS: no pulmonary, cardiovascular, gastrointestinal, urological or neurological symptoms.     PHYS:  T(C): 36.4 (09-12-19 @ 05:00), Max: 36.9 (09-11-19 @ 11:45)  HR: 82 (09-12-19 @ 05:00) (57 - 89)  BP: 126/56 (09-12-19 @ 05:00) (101/54 - 151/73)  RR: 16 (09-12-19 @ 05:00) (12 - 20)  SpO2: 98% (09-12-19 @ 05:00) (96% - 100%)  vss; lungs, clear; heart, reg rhythm, no murmurs, rubs or gallops;   abd, soft, non tender, normal bowel sounds, no calf tenderness or edema                         11.7   15.12 )-----------( 191      ( 12 Sep 2019 06:47 )             36.9   09-11    139  |  108  |  11  ----------------------------<  102<H>  4.7   |  26  |  0.88    Ca    8.3<L>      11 Sep 2019 09:45    Assessment and Plan: status post left total hip replacement; chronic obstructive pulmonary disease; asthma; Gastroesophageal reflux disease; postop leucocytosis; depression/anxiety; bipolar disorder; obesity (BMI=30.1); pain control; deep vein thrombophlebitis prophylaxis; physical therapy; bowel regimen; nutrition support; follow up labs; will follow.

## 2019-09-12 NOTE — PROGRESS NOTE ADULT - SUBJECTIVE AND OBJECTIVE BOX
Patient is seen and examined at bedside. Denies CP/SOB/Dizziness/N/V/D/HA. Pain is controlled.     Vital Signs Last 24 Hrs  T(C): 36.6 (12 Sep 2019 09:04), Max: 36.9 (11 Sep 2019 11:45)  T(F): 97.8 (12 Sep 2019 09:04), Max: 98.5 (11 Sep 2019 11:45)  HR: 63 (12 Sep 2019 09:04) (57 - 89)  BP: 121/59 (12 Sep 2019 09:04) (101/54 - 151/73)  BP(mean): --  RR: 17 (12 Sep 2019 09:04) (12 - 20)  SpO2: 96% (12 Sep 2019 09:04) (96% - 100%)    GEN: NAD  ABD: soft, NT/ND; no rebound or guarding;  Neurologic: AAOx3; CNS grossly intact; no focal deficits  LE: Dressing C/D/I. abduction pillow in place  B/L LE's: Motor intact + EHL/FHL/TA/GS in the BL LE. Sensation is grossly intact B/L. Extremities warm B/L. Compartments soft, compressible B/L, no calf tenderness B/L. DP 2+ B/L.    Labs:                          11.7   15.12 )-----------( 191      ( 12 Sep 2019 06:47 )             36.9       09-12    138  |  106  |  9   ----------------------------<  117<H>  4.3   |  25  |  0.68    Ca    8.4<L>      12 Sep 2019 06:47        A/P: Patient is a 49y y/o Female s/p left total hip arthroplasty, POD # 1  -wound care, isometric exercises, GI motility, new medications, hip precautions,  hospital course and discharge planning reviewed with pt  -Pain control/analgesia: diluadid 4mg with breakthrough pain medication. Encourage tylenol.   -Inc spirometry reviewed and counseled  -Venodynes/foot pumps  -PT/OT/WBAT  -Anticoagulation: ASA 325mg BID  -Pt seen in am with Dr Jeffrey  -HARINDER plan: Home today

## 2019-09-13 LAB — SURGICAL PATHOLOGY STUDY: SIGNIFICANT CHANGE UP

## 2019-09-14 DIAGNOSIS — F31.9 BIPOLAR DISORDER, UNSPECIFIED: ICD-10-CM

## 2019-09-14 DIAGNOSIS — M16.12 UNILATERAL PRIMARY OSTEOARTHRITIS, LEFT HIP: ICD-10-CM

## 2019-09-14 DIAGNOSIS — J45.909 UNSPECIFIED ASTHMA, UNCOMPLICATED: ICD-10-CM

## 2019-09-14 DIAGNOSIS — Z88.5 ALLERGY STATUS TO NARCOTIC AGENT: ICD-10-CM

## 2019-09-14 DIAGNOSIS — J44.9 CHRONIC OBSTRUCTIVE PULMONARY DISEASE, UNSPECIFIED: ICD-10-CM

## 2019-09-14 DIAGNOSIS — Z88.8 ALLERGY STATUS TO OTHER DRUGS, MEDICAMENTS AND BIOLOGICAL SUBSTANCES STATUS: ICD-10-CM

## 2019-09-14 DIAGNOSIS — Z91.041 RADIOGRAPHIC DYE ALLERGY STATUS: ICD-10-CM

## 2019-09-14 DIAGNOSIS — Z88.0 ALLERGY STATUS TO PENICILLIN: ICD-10-CM

## 2019-09-14 DIAGNOSIS — K21.9 GASTRO-ESOPHAGEAL REFLUX DISEASE WITHOUT ESOPHAGITIS: ICD-10-CM

## 2019-09-14 DIAGNOSIS — Z88.1 ALLERGY STATUS TO OTHER ANTIBIOTIC AGENTS STATUS: ICD-10-CM

## 2019-09-14 DIAGNOSIS — E66.9 OBESITY, UNSPECIFIED: ICD-10-CM

## 2019-09-14 DIAGNOSIS — F41.8 OTHER SPECIFIED ANXIETY DISORDERS: ICD-10-CM

## 2019-09-14 DIAGNOSIS — D72.829 ELEVATED WHITE BLOOD CELL COUNT, UNSPECIFIED: ICD-10-CM

## 2019-12-15 ENCOUNTER — EMERGENCY (EMERGENCY)
Facility: HOSPITAL | Age: 49
LOS: 1 days | Discharge: HOME | End: 2019-12-15
Attending: EMERGENCY MEDICINE | Admitting: STUDENT IN AN ORGANIZED HEALTH CARE EDUCATION/TRAINING PROGRAM
Payer: MEDICARE

## 2019-12-15 VITALS
OXYGEN SATURATION: 100 % | DIASTOLIC BLOOD PRESSURE: 70 MMHG | HEART RATE: 80 BPM | RESPIRATION RATE: 16 BRPM | TEMPERATURE: 98 F | SYSTOLIC BLOOD PRESSURE: 153 MMHG

## 2019-12-15 DIAGNOSIS — Z96.643 PRESENCE OF ARTIFICIAL HIP JOINT, BILATERAL: Chronic | ICD-10-CM

## 2019-12-15 DIAGNOSIS — Z98.890 OTHER SPECIFIED POSTPROCEDURAL STATES: Chronic | ICD-10-CM

## 2019-12-15 LAB
ALBUMIN SERPL ELPH-MCNC: 4.3 G/DL — SIGNIFICANT CHANGE UP (ref 3.3–5)
ALP SERPL-CCNC: 85 U/L — SIGNIFICANT CHANGE UP (ref 40–120)
ALT FLD-CCNC: 17 U/L — SIGNIFICANT CHANGE UP (ref 4–33)
ANION GAP SERPL CALC-SCNC: 13 MMO/L — SIGNIFICANT CHANGE UP (ref 7–14)
AST SERPL-CCNC: 22 U/L — SIGNIFICANT CHANGE UP (ref 4–32)
BASOPHILS # BLD AUTO: 0.02 K/UL — SIGNIFICANT CHANGE UP (ref 0–0.2)
BASOPHILS NFR BLD AUTO: 0.3 % — SIGNIFICANT CHANGE UP (ref 0–2)
BILIRUB SERPL-MCNC: 0.8 MG/DL — SIGNIFICANT CHANGE UP (ref 0.2–1.2)
BUN SERPL-MCNC: 9 MG/DL — SIGNIFICANT CHANGE UP (ref 7–23)
CALCIUM SERPL-MCNC: 10 MG/DL — SIGNIFICANT CHANGE UP (ref 8.4–10.5)
CHLORIDE SERPL-SCNC: 101 MMOL/L — SIGNIFICANT CHANGE UP (ref 98–107)
CO2 SERPL-SCNC: 25 MMOL/L — SIGNIFICANT CHANGE UP (ref 22–31)
CREAT SERPL-MCNC: 0.83 MG/DL — SIGNIFICANT CHANGE UP (ref 0.5–1.3)
EOSINOPHIL # BLD AUTO: 0.11 K/UL — SIGNIFICANT CHANGE UP (ref 0–0.5)
EOSINOPHIL NFR BLD AUTO: 1.4 % — SIGNIFICANT CHANGE UP (ref 0–6)
GLUCOSE SERPL-MCNC: 99 MG/DL — SIGNIFICANT CHANGE UP (ref 70–99)
HCT VFR BLD CALC: 43.4 % — SIGNIFICANT CHANGE UP (ref 34.5–45)
HGB BLD-MCNC: 13.8 G/DL — SIGNIFICANT CHANGE UP (ref 11.5–15.5)
IMM GRANULOCYTES NFR BLD AUTO: 0.1 % — SIGNIFICANT CHANGE UP (ref 0–1.5)
INR BLD: 1.14 — SIGNIFICANT CHANGE UP (ref 0.88–1.17)
LYMPHOCYTES # BLD AUTO: 2.59 K/UL — SIGNIFICANT CHANGE UP (ref 1–3.3)
LYMPHOCYTES # BLD AUTO: 34.1 % — SIGNIFICANT CHANGE UP (ref 13–44)
MCHC RBC-ENTMCNC: 25.3 PG — LOW (ref 27–34)
MCHC RBC-ENTMCNC: 31.8 % — LOW (ref 32–36)
MCV RBC AUTO: 79.6 FL — LOW (ref 80–100)
MONOCYTES # BLD AUTO: 0.48 K/UL — SIGNIFICANT CHANGE UP (ref 0–0.9)
MONOCYTES NFR BLD AUTO: 6.3 % — SIGNIFICANT CHANGE UP (ref 2–14)
NEUTROPHILS # BLD AUTO: 4.38 K/UL — SIGNIFICANT CHANGE UP (ref 1.8–7.4)
NEUTROPHILS NFR BLD AUTO: 57.8 % — SIGNIFICANT CHANGE UP (ref 43–77)
NRBC # FLD: 0 K/UL — SIGNIFICANT CHANGE UP (ref 0–0)
PLATELET # BLD AUTO: 309 K/UL — SIGNIFICANT CHANGE UP (ref 150–400)
PMV BLD: 11.6 FL — SIGNIFICANT CHANGE UP (ref 7–13)
POTASSIUM SERPL-MCNC: 4.3 MMOL/L — SIGNIFICANT CHANGE UP (ref 3.5–5.3)
POTASSIUM SERPL-SCNC: 4.3 MMOL/L — SIGNIFICANT CHANGE UP (ref 3.5–5.3)
PROT SERPL-MCNC: 7.9 G/DL — SIGNIFICANT CHANGE UP (ref 6–8.3)
PROTHROM AB SERPL-ACNC: 12.7 SEC — SIGNIFICANT CHANGE UP (ref 9.8–13.1)
RBC # BLD: 5.45 M/UL — HIGH (ref 3.8–5.2)
RBC # FLD: 14.9 % — HIGH (ref 10.3–14.5)
SODIUM SERPL-SCNC: 139 MMOL/L — SIGNIFICANT CHANGE UP (ref 135–145)
TROPONIN T, HIGH SENSITIVITY: < 6 NG/L — SIGNIFICANT CHANGE UP (ref ?–14)
TROPONIN T, HIGH SENSITIVITY: < 6 NG/L — SIGNIFICANT CHANGE UP (ref ?–14)
WBC # BLD: 7.59 K/UL — SIGNIFICANT CHANGE UP (ref 3.8–10.5)
WBC # FLD AUTO: 7.59 K/UL — SIGNIFICANT CHANGE UP (ref 3.8–10.5)

## 2019-12-15 PROCEDURE — 71046 X-RAY EXAM CHEST 2 VIEWS: CPT | Mod: 26

## 2019-12-15 PROCEDURE — 93970 EXTREMITY STUDY: CPT | Mod: 26

## 2019-12-15 PROCEDURE — 93010 ELECTROCARDIOGRAM REPORT: CPT | Mod: 59,GC

## 2019-12-15 PROCEDURE — 99218: CPT | Mod: 25,GC

## 2019-12-15 RX ORDER — SODIUM CHLORIDE 9 MG/ML
1000 INJECTION INTRAMUSCULAR; INTRAVENOUS; SUBCUTANEOUS ONCE
Refills: 0 | Status: COMPLETED | OUTPATIENT
Start: 2019-12-15 | End: 2019-12-15

## 2019-12-15 RX ORDER — LIDOCAINE 4 G/100G
1 CREAM TOPICAL ONCE
Refills: 0 | Status: COMPLETED | OUTPATIENT
Start: 2019-12-15 | End: 2019-12-15

## 2019-12-15 RX ORDER — ACETAMINOPHEN 500 MG
650 TABLET ORAL ONCE
Refills: 0 | Status: COMPLETED | OUTPATIENT
Start: 2019-12-15 | End: 2019-12-15

## 2019-12-15 RX ADMIN — Medication 650 MILLIGRAM(S): at 19:38

## 2019-12-15 RX ADMIN — SODIUM CHLORIDE 1000 MILLILITER(S): 9 INJECTION INTRAMUSCULAR; INTRAVENOUS; SUBCUTANEOUS at 12:50

## 2019-12-15 RX ADMIN — LIDOCAINE 1 PATCH: 4 CREAM TOPICAL at 19:39

## 2019-12-15 RX ADMIN — Medication 650 MILLIGRAM(S): at 20:09

## 2019-12-15 NOTE — ED CDU PROVIDER INITIAL DAY NOTE - OBJECTIVE STATEMENT
48 y/o Female with a PMHx of asthma and anxiety presents to the ER c/o intermittent chest pain x 2 days. Pt states chest pain is mid-sternal and radiates to the jaw with associated SOB and diaphoresis. Pt states symptoms occur at rest. Pt reports intermittent light-headedness.  Pt denies headache, fever, chills, abdominal pain, back pain, cough , recent illness, palpations, leg pain , leg swelling.    CDU AMARA Whipple: 49 y.o female pmhx of asthma and anxiety coming in with intermittent chest pain over the last 2 days. In the ED, trop < 6, xray chest normal. Sent to CDU for Tele, Stress. Denies any current fevers, chest pain, sob, cough, n/v/d, abdominal pain, numbness, tingling, weakness, urinary symptoms.

## 2019-12-15 NOTE — ED PROVIDER NOTE - CLINICAL SUMMARY MEDICAL DECISION MAKING FREE TEXT BOX
48 y/o F with PMHx of asthma and anxiety presents to ED c/o intermittent chest pain since 2 days. Patient is well appearing and in NAD. EKG with NSR. Plan to check labs, CXR, and likely CDU for further cardiac monitoring/workup. 48 y/o Female with a PMHx of asthma and anxiety presents to the ER c/o intermittent chest pain x 2 days. Patient is well appearing NAD. EKG NSR. Will check labs, CXR, and likely CDU for further cardiac monitoring/workup.

## 2019-12-15 NOTE — ED PROVIDER NOTE - MUSCULOSKELETAL, MLM
Spine appears normal, range of motion is not limited, no muscle or joint tenderness. Patient ambulatory with cane at baseline.

## 2019-12-15 NOTE — ED PROVIDER NOTE - OBJECTIVE STATEMENT
48 y/o F with PMHx of asthma and anxiety presents to ED c/o intermittent chest pain since 2 days. Pt states chest pain is mid-sternal and radiates to jaw. Associated with these symptoms pt endorsing SOB and diaphoresis. Pt reports symptoms typically occur at rest. Presently pt feeling light-headed. Denies having any headache, fever, chills, abdominal pain, back pain, cough , recent illness, palpations, leg pain , leg swelling, or other medical complaints. 48 y/o Female with a PMHx of asthma and anxiety presents to the ER c/o intermittent chest pain x 2 days. Pt states chest pain is mid-sternal and radiates to the jaw with associated SOB and diaphoresis. Pt states symptoms occur at rest. Pt reports intermittent light-headedness.  Pt denies headache, fever, chills, abdominal pain, back pain, cough , recent illness, palpations, leg pain , leg swelling.

## 2019-12-15 NOTE — ED PROVIDER NOTE - ATTENDING CONTRIBUTION TO CARE
Pt was seen and evaluated by me. Pt is a 50 y/o female with PMHx of Asthma and Anxiety who presented to the ED for intermittent chest pain X 2 days. Pt states over the past 2 days having chest pain with minimal activity like getting up that is described as a pressure and assocated with SOB and diaphoresis. Pt denies any fever, chills, nausea, vomiting, abd pain, or diarrhea. Lungs CTA b/l. RRR. Abd soft, non-tender.

## 2019-12-15 NOTE — ED CDU PROVIDER INITIAL DAY NOTE - ATTENDING CONTRIBUTION TO CARE
48 yo F pmh asthma, anxiety, bilateral hip replacements with intermittent cp radiating to her jaw, associated with sob, diaphoresis. patient asymptomatic at time of eval. plan: tele monitor, pharm stress in AM.

## 2019-12-15 NOTE — ED CDU PROVIDER INITIAL DAY NOTE - PROGRESS NOTE DETAILS
cdu tobin nicholson: pt examiend at bedside, resting comfortably, denies any current chest pain, sob, abdominal pain, will continue tele, get stress test in the AM and reasses

## 2019-12-15 NOTE — ED ADULT TRIAGE NOTE - CHIEF COMPLAINT QUOTE
Pt states that she has been feeling lightheaded and had episode of chest heaviness since yesterday. Pt states that she has been sick for the last week and attributed her sx to asthma.  past medical; history: asthma, hip replacements

## 2019-12-15 NOTE — ED PROVIDER NOTE - CHPI ED SYMPTOMS NEG
headache, abdominal pain , leg pain , leg swelling/no back pain/no chills/no fever/no cough no chills/no nausea/no back pain/no cough/no vomiting/no fever

## 2019-12-16 VITALS
OXYGEN SATURATION: 100 % | RESPIRATION RATE: 17 BRPM | TEMPERATURE: 98 F | SYSTOLIC BLOOD PRESSURE: 130 MMHG | DIASTOLIC BLOOD PRESSURE: 71 MMHG | HEART RATE: 79 BPM

## 2019-12-16 PROBLEM — F41.9 ANXIETY DISORDER, UNSPECIFIED: Chronic | Status: ACTIVE | Noted: 2019-08-21

## 2019-12-16 PROBLEM — J44.9 CHRONIC OBSTRUCTIVE PULMONARY DISEASE, UNSPECIFIED: Chronic | Status: ACTIVE | Noted: 2019-08-21

## 2019-12-16 PROBLEM — K21.9 GASTRO-ESOPHAGEAL REFLUX DISEASE WITHOUT ESOPHAGITIS: Chronic | Status: ACTIVE | Noted: 2019-08-21

## 2019-12-16 PROBLEM — F31.9 BIPOLAR DISORDER, UNSPECIFIED: Chronic | Status: ACTIVE | Noted: 2019-08-21

## 2019-12-16 PROBLEM — J45.909 UNSPECIFIED ASTHMA, UNCOMPLICATED: Chronic | Status: ACTIVE | Noted: 2019-09-11

## 2019-12-16 LAB — HBA1C BLD-MCNC: 5.6 % — SIGNIFICANT CHANGE UP (ref 4–5.6)

## 2019-12-16 PROCEDURE — 78452 HT MUSCLE IMAGE SPECT MULT: CPT | Mod: 26

## 2019-12-16 PROCEDURE — 93018 CV STRESS TEST I&R ONLY: CPT | Mod: GC

## 2019-12-16 PROCEDURE — 99217: CPT | Mod: GC

## 2019-12-16 PROCEDURE — 93016 CV STRESS TEST SUPVJ ONLY: CPT | Mod: GC

## 2019-12-16 PROCEDURE — 76705 ECHO EXAM OF ABDOMEN: CPT | Mod: 26

## 2019-12-16 RX ADMIN — LIDOCAINE 1 PATCH: 4 CREAM TOPICAL at 08:25

## 2019-12-16 NOTE — ED CDU PROVIDER DISPOSITION NOTE - NSFOLLOWUPINSTRUCTIONS_ED_ALL_ED_FT
Please follow up with the cardiologist as soon as possible. Call the office within 2 days to make an appointment.  Please also follow up with your primary care doctor in 1-2 days. If you have any new, worsened or concerning symptoms, please return to the emergency department immediately.

## 2019-12-16 NOTE — ED CDU PROVIDER DISPOSITION NOTE - PATIENT PORTAL LINK FT
You can access the FollowMyHealth Patient Portal offered by [facility] by registering at the following website: [email or web address]. By joining [employer]’s FollowMyHealth portal, you will also be able to view your health information using other applications (apps) compatible with our system. You can access the FollowMyHealth Patient Portal offered by [facility] by registering at the following website: [email or web address]. By joining [employer]’s FollowMyHealth portal, you will also be able to view your health information using other applications (apps) compatible with our system.

## 2019-12-16 NOTE — ED CDU PROVIDER SUBSEQUENT DAY NOTE - PROGRESS NOTE DETAILS
tobin nicholson: pt rested comfortablyin bed overnight, had no chest pain, sob, abdominal pain. on tele, will get stress/echo in the AM Pt seen during morning rounds, stable on monitor, currently chest pain free. Nuclear stress test normal. Pt to follow with outpatient cardiology. Return precautions. PMD follow up.

## 2019-12-16 NOTE — ED CDU PROVIDER DISPOSITION NOTE - CLINICAL COURSE
49 y.o female pmhx of asthma and anxiety coming in with intermittent chest pain over the last 2 days. In the ED, trop < 6, xray chest normal. Sent to CDU for Tele, Stress. Denies any current fevers, chest pain, sob, cough, n/v/d, abdominal pain, numbness, tingling, weakness, urinary symptoms.    Pt seen during morning rounds, stable on monitor, currently chest pain free. Nuclear stress test normal. Pt to follow with outpatient cardiology. Return precautions. PMD follow up.

## 2019-12-16 NOTE — ED CDU PROVIDER SUBSEQUENT DAY NOTE - ATTENDING CONTRIBUTION TO CARE
49 y.o female pmhx of asthma and anxiety coming in with intermittent chest pain over the last 2 days. In the ED, trop < 6, xray chest normal. Sent to CDU for Tele, Stress. Denies any current fevers, chest pain, sob, cough, n/v/d, abdominal pain, numbness, tingling, weakness, urinary symptoms. During morning rounds, stable on monitor, currently chest pain free. Nuclear stress test normal. Pt to follow with outpatient cardiology. Return precautions. PMD follow up.    LUCIANA Garcia: I have personally performed a face to face bedside history and physical examination of this patient. I have discussed the history, examination, review of systems, assessment and plan of management with the resident. I have reviewed the electronic medical record and amended it to reflect my history, review of systems, physical exam, assessment and plan.

## 2019-12-16 NOTE — ED CDU PROVIDER SUBSEQUENT DAY NOTE - HISTORY
49 y.o female pmhx of asthma and anxiety coming in with intermittent chest pain over the last 2 days. In the ED, trop < 6, xray chest normal. Sent to CDU for Tele, Stress. Denies any current fevers, chest pain, sob, cough, n/v/d, abdominal pain, numbness, tingling, weakness, urinary symptoms.

## 2020-04-28 ENCOUNTER — EMERGENCY (EMERGENCY)
Facility: HOSPITAL | Age: 50
LOS: 1 days | Discharge: HOME | End: 2020-04-28
Attending: EMERGENCY MEDICINE | Admitting: EMERGENCY MEDICINE
Payer: MEDICARE

## 2020-04-28 VITALS
TEMPERATURE: 98 F | SYSTOLIC BLOOD PRESSURE: 143 MMHG | OXYGEN SATURATION: 100 % | RESPIRATION RATE: 22 BRPM | HEART RATE: 75 BPM | DIASTOLIC BLOOD PRESSURE: 72 MMHG

## 2020-04-28 VITALS — OXYGEN SATURATION: 97 %

## 2020-04-28 DIAGNOSIS — Z98.890 OTHER SPECIFIED POSTPROCEDURAL STATES: Chronic | ICD-10-CM

## 2020-04-28 DIAGNOSIS — Z96.643 PRESENCE OF ARTIFICIAL HIP JOINT, BILATERAL: Chronic | ICD-10-CM

## 2020-04-28 PROBLEM — F41.9 ANXIETY DISORDER, UNSPECIFIED: Chronic | Status: ACTIVE | Noted: 2019-12-15

## 2020-04-28 PROBLEM — J45.909 UNSPECIFIED ASTHMA, UNCOMPLICATED: Chronic | Status: ACTIVE | Noted: 2019-12-15

## 2020-04-28 LAB
ALBUMIN SERPL ELPH-MCNC: 4 G/DL — SIGNIFICANT CHANGE UP (ref 3.3–5)
ALP SERPL-CCNC: 63 U/L — SIGNIFICANT CHANGE UP (ref 40–120)
ALT FLD-CCNC: 14 U/L — SIGNIFICANT CHANGE UP (ref 4–33)
ANION GAP SERPL CALC-SCNC: 11 MMO/L — SIGNIFICANT CHANGE UP (ref 7–14)
APTT BLD: 24.5 SEC — LOW (ref 27.5–36.3)
AST SERPL-CCNC: 19 U/L — SIGNIFICANT CHANGE UP (ref 4–32)
BASOPHILS # BLD AUTO: 0.02 K/UL — SIGNIFICANT CHANGE UP (ref 0–0.2)
BASOPHILS NFR BLD AUTO: 0.1 % — SIGNIFICANT CHANGE UP (ref 0–2)
BILIRUB SERPL-MCNC: 0.9 MG/DL — SIGNIFICANT CHANGE UP (ref 0.2–1.2)
BUN SERPL-MCNC: 7 MG/DL — SIGNIFICANT CHANGE UP (ref 7–23)
CALCIUM SERPL-MCNC: 9.6 MG/DL — SIGNIFICANT CHANGE UP (ref 8.4–10.5)
CHLORIDE SERPL-SCNC: 103 MMOL/L — SIGNIFICANT CHANGE UP (ref 98–107)
CO2 SERPL-SCNC: 24 MMOL/L — SIGNIFICANT CHANGE UP (ref 22–31)
CREAT SERPL-MCNC: 0.85 MG/DL — SIGNIFICANT CHANGE UP (ref 0.5–1.3)
D DIMER BLD IA.RAPID-MCNC: 188 NG/ML — SIGNIFICANT CHANGE UP
EOSINOPHIL # BLD AUTO: 0.04 K/UL — SIGNIFICANT CHANGE UP (ref 0–0.5)
EOSINOPHIL NFR BLD AUTO: 0.3 % — SIGNIFICANT CHANGE UP (ref 0–6)
GLUCOSE SERPL-MCNC: 96 MG/DL — SIGNIFICANT CHANGE UP (ref 70–99)
HCT VFR BLD CALC: 43.5 % — SIGNIFICANT CHANGE UP (ref 34.5–45)
HGB BLD-MCNC: 14 G/DL — SIGNIFICANT CHANGE UP (ref 11.5–15.5)
IMM GRANULOCYTES NFR BLD AUTO: 0.7 % — SIGNIFICANT CHANGE UP (ref 0–1.5)
INR BLD: 1.06 — SIGNIFICANT CHANGE UP (ref 0.88–1.17)
LYMPHOCYTES # BLD AUTO: 2.97 K/UL — SIGNIFICANT CHANGE UP (ref 1–3.3)
LYMPHOCYTES # BLD AUTO: 21.6 % — SIGNIFICANT CHANGE UP (ref 13–44)
MAGNESIUM SERPL-MCNC: 2.3 MG/DL — SIGNIFICANT CHANGE UP (ref 1.6–2.6)
MCHC RBC-ENTMCNC: 25.1 PG — LOW (ref 27–34)
MCHC RBC-ENTMCNC: 32.2 % — SIGNIFICANT CHANGE UP (ref 32–36)
MCV RBC AUTO: 78 FL — LOW (ref 80–100)
MONOCYTES # BLD AUTO: 0.62 K/UL — SIGNIFICANT CHANGE UP (ref 0–0.9)
MONOCYTES NFR BLD AUTO: 4.5 % — SIGNIFICANT CHANGE UP (ref 2–14)
NEUTROPHILS # BLD AUTO: 10.04 K/UL — HIGH (ref 1.8–7.4)
NEUTROPHILS NFR BLD AUTO: 72.8 % — SIGNIFICANT CHANGE UP (ref 43–77)
NRBC # FLD: 0 K/UL — SIGNIFICANT CHANGE UP (ref 0–0)
PHOSPHATE SERPL-MCNC: 3.4 MG/DL — SIGNIFICANT CHANGE UP (ref 2.5–4.5)
PLATELET # BLD AUTO: 276 K/UL — SIGNIFICANT CHANGE UP (ref 150–400)
PMV BLD: 10.8 FL — SIGNIFICANT CHANGE UP (ref 7–13)
POTASSIUM SERPL-MCNC: 3.9 MMOL/L — SIGNIFICANT CHANGE UP (ref 3.5–5.3)
POTASSIUM SERPL-SCNC: 3.9 MMOL/L — SIGNIFICANT CHANGE UP (ref 3.5–5.3)
PROT SERPL-MCNC: 7.7 G/DL — SIGNIFICANT CHANGE UP (ref 6–8.3)
PROTHROM AB SERPL-ACNC: 12.2 SEC — SIGNIFICANT CHANGE UP (ref 9.8–13.1)
RBC # BLD: 5.58 M/UL — HIGH (ref 3.8–5.2)
RBC # FLD: 16.5 % — HIGH (ref 10.3–14.5)
SODIUM SERPL-SCNC: 138 MMOL/L — SIGNIFICANT CHANGE UP (ref 135–145)
WBC # BLD: 13.78 K/UL — HIGH (ref 3.8–10.5)
WBC # FLD AUTO: 13.78 K/UL — HIGH (ref 3.8–10.5)

## 2020-04-28 PROCEDURE — 93010 ELECTROCARDIOGRAM REPORT: CPT

## 2020-04-28 PROCEDURE — 99284 EMERGENCY DEPT VISIT MOD MDM: CPT | Mod: 25

## 2020-04-28 PROCEDURE — 71045 X-RAY EXAM CHEST 1 VIEW: CPT | Mod: 26

## 2020-04-28 NOTE — ED PROVIDER NOTE - PATIENT PORTAL LINK FT
You can access the FollowMyHealth Patient Portal offered by [facility] by registering at the following website: [email or web address]. By joining [employer]’s FollowMyHealth portal, you will also be able to view your health information using other applications (apps) compatible with our system.

## 2020-04-28 NOTE — ED PROVIDER NOTE - PRINCIPAL DIAGNOSIS
Spoke to patient and she scheduled an appointment for today with Dr. Vaughn Yeboah to have him look at her foot.      MARGARITA Shortness of breath

## 2020-04-28 NOTE — ED ADULT TRIAGE NOTE - CHIEF COMPLAINT QUOTE
states " I had COVID type of symptoms since end of March and I was told that I have pneumonia and [facility], but never got tested for COVID. c/o SOB with chest pain and weakness with dizziness.

## 2020-04-28 NOTE — ED ADULT NURSE NOTE - PMH
Anxiety    Anxiety and depression    Asthma    Asthma    Bipolar disorder    Chronic GERD    COPD, mild

## 2020-04-28 NOTE — ED PROVIDER NOTE - NSFOLLOWUPINSTRUCTIONS_ED_ALL_ED_FT
Shortness of breath    Shortness of breath (dyspnea) means you have trouble breathing and could indicate a medical problem. Causes include lung disease, heart disease, low amount of red blood cells (anemia), poor physical fitness, being overweight, smoking, etc. Your health care provider today may not be able to find a cause for your shortness of breath after your exam. In this case, it is important to have a follow-up exam with your primary care physician as instructed. If medicines were prescribed, take them as directed for the full length of time directed. Refrain from tobacco products.    SEEK IMMEDIATE MEDICAL CARE IF YOU HAVE ANY OF THE FOLLOWING SYMPTOMS: worsening shortness of breath, chest pain, back pain, abdominal pain, fever, coughing up blood, lightheadedness/dizziness.      You can take acetaminophen (aka tylenol, 1000 mg every 6-8 hours) for pain. Do not exceed 4000 mg acetaminophen (tylenol) in a 24 hour period. Be aware if any of your other medications contain acetaminophen so as not to exceed the maximum daily dose.    We are recommending a 14 day quarantine. At the end of that time, you must meet all of the following criteria:  - You have been without fever for at least 24 hours, without the use of medication to suppress fever  - Your other symptoms are improving  - If you tested positive for COVID-19, it has been at least 7 days since that test was performed    If any of the above are not true for you after the 14 day period (e.g. continued fevers) call your primary care doctor or the emergency department for additional guidance.    You should restrict the number of people in your home except for those who must absolutely be present (caregivers, children, spouse, etc). Pay special attention to avoid contact with those over the age of 65 or with significant chronic medical conditions (see below).    Illness severe enough to warrant to hospitalization can happen sometimes, even in people who were initially well enough to monitor themselves at home. You should return to the hospital immediately if you experience worsening shortness of breath, confusion, or any other new or concerning symptoms. If you are over the age of 65, or have chronic medical conditions such as chronic heart disease, asthma, COPD, kidney or liver problems, cancer, or take any drugs that suppress the immune system, you should have a low threshold for returning to the emergency department.

## 2020-04-28 NOTE — ED PROVIDER NOTE - OBJECTIVE STATEMENT
49F hx depression, asthma and copd presenting w/ worsening shortness of breath. symptoms started around march 12 49F hx depression, asthma and copd presenting w/ worsening shortness of breath, with sensation of chest tightness, and feeling that her lungs don't fully expand. Symptoms ongoing since March 12th wherein she had fever, cough, and generalized weakness that she suspected was due to covid. Since then, she feels that her breathing has not improved. She denies recent fevers, cough, nausea/vomiting/ abd pain. Notes dyspnea is exertional. Feels intermittent anterior chest tightness with deep inhalation. No fam hx of blood clots. No ocps. Trialled a steroid regimen per her pulmonologist without symptoms improvement.  Never intubated for copd/ asthma

## 2020-04-28 NOTE — ED PROVIDER NOTE - PHYSICAL EXAMINATION
Gen: AAOx3, NAD  Head: NCAT  ENT: Airway patent, moist mucous membranes, nasal passageways clear  Cardiac: Normal rate, normal rhythm, no murmurs  Respiratory: Lungs CTA B/L  Gastrointestinal: Abdomen soft, nontender, nondistended, no rebound, no guarding  MSK: No gross abnormalities, FROM of all four extremities, no edema  HEME: Extremities warm, radial pulses intact and symmetrical    Skin: No rashes, no lesions  Neuro: No gross neurologic deficits

## 2020-04-28 NOTE — ED ADULT NURSE NOTE - OBJECTIVE STATEMENT
Pt received to room 1 AAO x 3 and ambulatory c/o SOB, chest pain and dizziness that is persistant the past few weeks. Pt states she was treated for PNA the end of March and denies any recent fever. NSR noted on cardiac monitor and pt breathing with ease on room air. Labs sent, 18G placed to the right AC, awaiting CT and eval in progress.

## 2020-04-28 NOTE — ED PROVIDER NOTE - NS ED ROS FT
Gen: No fever, normal appetite  Eyes: No eye irritation or discharge  ENT: No ear pain, congestion, sore throat  Resp: No cough, + sob   Cardiovascular: No chest pain or palpitation  Gastroenteric: No nausea/vomiting, diarrhea, +constipation  :  No change in urine output; no dysuria  MS: No joint or muscle pain  Skin: No rashes  Neuro: No headache; no abnormal movements  Remainder negative, except as per the HPI

## 2020-04-28 NOTE — ED PROVIDER NOTE - PROGRESS NOTE DETAILS
[facility]inson PGY3: amb sat 96-97%, stable , will f/u with pulmonologist pt informed and given copies of results. pulse ox ambulatory was 97RA pt AO3 comfortable w dc home

## 2020-07-20 NOTE — ED PROVIDER NOTE - ENMT, MLM
Airway patent, Nasal mucosa clear. Mouth with normal mucosa. Throat has no vesicles, no oropharyngeal exudates and uvula is midline. Physical therapist

## 2021-02-19 NOTE — ED CDU PROVIDER DISPOSITION NOTE - DISCHARGE REVIEW MATERIAL PRESENTED
DATE OF VISIT:  6/29/17    SUBJECTIVE:  The patient seen and examined. No new complains today. He will likely receive another session of radiation therapy today and possible transfer to [facility]. Labs from today show that his Na dropped to 125 today.     PHYSICAL EXAMINATION:  Vital Signs (last 24 hrs)_____  Last Charted___________  Temp Oral      98.3 F  (JUN 29 07:47)  Heart Rate Peripheral   H 123bpm  (JUN 29 07:47)  Resp Rate          14 br/min  (JUN 29 07:47)  SBP      95 mmHg  (JUN 29 07:47)  DBP      L 57mmHg  (JUN 29 07:47)      GENERAL:  The patient is alert and oriented x 3.      HEENT:  Head is atraumatic and normocephalic.  Oral mucosa dry     NECK:  Supple.  No JVD.     LUNGS:  Clear to auscultation.  Decreased breath sounds at the bases.     CARDIOVASCULAR:  Regular rate rhythm.  No rub, murmur, or gallop.     ABDOMEN:  Soft, nontender.  Bowel sounds present.     EXTREMITIES:  No edema.     :  The patient voiding on his own.     SKIN:  No rashes.     CNS:  No new focal deficits.     MEDICATIONS:  Reviewed.    Labs (Last four charted values)  WBC                  H 47.5 (JUN 28) H 41.2 (JUN 27) H 39.0 (PHAN 25) H 36.6 (PHAN 24)   Hgb                  L 7.9 (PHAN 28) L 7.9 (PHAN 27) C 6.9 (PHAN 27) L 7.0 (PHAN 25)   Hct                  L 25 (PHAN 28) L 24 (PHAN 27) L 21 (PHAN 27) L 23 (PHAN 25)   Plt                  447 (PHAN 28) 422 (PHAN 27) H 454 (PHAN 25) 444 (PHAN 24)   Na                   L 128 (PHAN 28) L 129 (PHAN 27) L 128 (PHAN 26) L 125 (PHAN 26)   K                    5.0 (PHAN 28) 4.7 (PHAN 27) 4.7 (PHAN 26) 4.8 (PHAN 26)   CO2                  25 (PHAN 28) 24 (PHAN 27) 23 (PHAN 26) 22 (PHAN 26)   Cl                   L 95 (PHAN 28) 97 (PHAN 27) L 93 (PHAN 26) L 93 (PHAN 26)   Cr                   0.77 (PHAN 28) 0.76 (PHAN 27) 0.83 (PHAN 26) 0.77 (PHAN 26)   BUN                  19 (PHAN 28) 19 (PHAN 27) H 23 (PHAN 26) H 21 (PHAN 26)   Glucose              H 171 (PHAN 28) H 172 (PHAN 27) H 241 (PHAN 26) H  161 (JUN 26)   Mg                   1.6 (JUN 27) 1.8 (JUN 26) 1.6 (JUN 25)   Ca                   8.7 (JUN 28) 8.6 (JUN 27) L 7.9 (JUN 26) 8.4 (JUN 26)       IMPRESSION/PLAN:    1. Hyperkalemia, improved  - Cause likely related to mild volume depletion  - Continue low K diet.    2. Hyponatremia  - Acute on chronic  - Na again low at 125 today  - He likely has  chronic hyponatremia at baseline secondary to SIADH  in the setting of his metastatic lung cancer.   - His serum Na  was ranging between 128-130.  His Na dropped to 125  today which is likely hypovolemic in the setting low oral intake. His cosyntrophin test was normal.   - will give him NS x 1 L today   - He has refused to take salt tablets before     3. 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  Undergoing palliative radiation therapy  yesterday.     4. Anemia in the setting of cancer.     5. Hypotension: Continue to hold losartan for now.    6. Hypoalbuminemia/Protein calorie malnutrition: Continue to encourage good oral intake.     Thank you for involving me in the care of the patient.  Please call with questions.     Erin Oropeza MD    Nephrology          .

## 2021-12-01 NOTE — PHYSICAL THERAPY INITIAL EVALUATION ADULT - PHYSICAL ASSIST/NONPHYSICAL ASSIST: STAND/SIT, REHAB EVAL
Detail Level: Detailed Quality 137: Melanoma: Continuity Of Care - Recall System: Documentation of system reason(s) for not entering patient's information into a recall system (eg, melanoma being monitored by another physician provider) Quality 138: Melanoma: Coordination Of Care: A treatment plan was communicated to the physicians providing continuing care within one month of diagnosis outlining: diagnosis, tumor thickness and a plan for surgery or alternate care. Show Follow-Up Variable: Yes When Should The Patient Follow-Up For Their Next Full-Body Skin Exam?: 3 Months supervision

## 2022-05-25 PROBLEM — Z00.00 ENCOUNTER FOR PREVENTIVE HEALTH EXAMINATION: Status: ACTIVE | Noted: 2022-05-25

## 2022-06-03 ENCOUNTER — APPOINTMENT (OUTPATIENT)
Dept: ORTHOPEDIC SURGERY | Facility: CLINIC | Age: 52
End: 2022-06-03

## 2022-07-15 ENCOUNTER — APPOINTMENT (OUTPATIENT)
Dept: ORTHOPEDIC SURGERY | Facility: CLINIC | Age: 52
End: 2022-07-15

## 2022-07-26 ENCOUNTER — APPOINTMENT (OUTPATIENT)
Dept: ORTHOPEDIC SURGERY | Facility: CLINIC | Age: 52
End: 2022-07-26

## 2022-07-26 ENCOUNTER — RESULT CHARGE (OUTPATIENT)
Age: 52
End: 2022-07-26

## 2022-07-26 VITALS — WEIGHT: 211 LBS | BODY MASS INDEX: 31.98 KG/M2 | HEIGHT: 68 IN

## 2022-07-26 PROCEDURE — 73502 X-RAY EXAM HIP UNI 2-3 VIEWS: CPT | Mod: RT

## 2022-07-26 PROCEDURE — 99213 OFFICE O/P EST LOW 20 MIN: CPT | Mod: PA

## 2022-07-26 PROCEDURE — 99072 ADDL SUPL MATRL&STAF TM PHE: CPT

## 2022-07-26 NOTE — ASSESSMENT
[FreeTextEntry1] : Previous doc:\par Right CLARIBEL 216 always with pain and stiffness. Left hip adv OA but hesitant to proceed with CLARIBEL as she is still struggling with her right side. Known Lspine disc disease radiating down her leg - recc spine consult. Seeing rheum working on meds. If neg Lspine eval will consider proceeding with left CLARIBEL - her right CLARIBEL has met a baseline and is functionally better than her left hip.\par 8/17/18: Neg spine eval. Left hip pain is worsening and will plan for left CLARIBEL. She still feels her right hip has some pain but has had neg workup in the past and is all lateral muscular pain. Left hip is clearly intraarticular pain in the groin which is causing her right hip muscular pain due to altered gait and chronic dysfunction. Her only option at this point is to proceed with left CLARIBEL.\par 9/28/18: Continued pain - she will call [facility] to schedule CLARIBEL. Recent fall with small contusion to right low back and buttock. \par 3/8/19: No change on XR - will plan for CLARIBEL.\par 10/4 doing ok had some blisters - but no sign of infection - she is doing OK - work on ROM\par 11/15 doing well - still feels heavy and stiff- this is doing well for her - she is improving - I hope that the reason her rt side had such slow recovery due to stiffness in left and feel with her PT now she will get better with both sides\par 1/10/20: Cont pain and stiffness in both hips and she is frustrated by her lack of function. I feel her ongoing difficulty is related to the severity of her preop function and recc cont PT and add Celebrex. Reeval in 6 weeks.\par 8/10/20: ROM is improving. Still feeling stiffness in both hips . Recommended HEP to cont improvement of ROM. Cannot continue PT due to shortness of breathe. Follow up in 3 months. overall functionally she is better and walking better but stil lwith sig disability- I am concerned she may have some other underlying condition for hior hips and lungs- recommend Rh again\par 12/1/20: No change - still having difficulty with pulm disease and is seeing rheum again next month. Cannot do PT at this point because of her lungs. she will bring lab results from rheum\par 4/2/21: overall significantly better motion in the hips but But not within the normal range. still has pain radiating down her sides, may be rheumatological vs her lower back. Was given PT script by rheumatologist. Will bring in lab results at next visit.\par 8/3/21: Recent episode of pain - being treated by rheum with Cymbalta and PT, getting new blood work next month.\par 11/5/21: Groin pain has resolved - still with lateral pain. She had blood work done with rheum. We will try some formal PT to work on her lateral hip pain as this has helped in the past.\par 2/4/22: Flare up of her foot pain is causing some increased hip pain. She will return to PT once her foot pain has resolved. Rx for aquatherapy. f/up in 3 months.\par \par 7/26/22: Some lateral hip and low back pain, rarely in groin.  Again recc PT for this to improve ROM.  Cannot take NSAIDs due to asthma and motrin allergy.  She is going to try to get PT from her rheumatologist as this has been easier for her in the past.

## 2022-07-26 NOTE — HISTORY OF PRESENT ILLNESS
[8] : 8 [Dull/Aching] : dull/aching [Radiating] : radiating [Throbbing] : throbbing [Not working due to injury] : Work status: not working due to injury [de-identified] : 7/26/22: Having some pain lately.  Was unable to get PT appointments.\par \par Previous doc:\par WC 8/14/14. \par Patient known to me for right CLARIBEL 6/1/16 always with pain and stiffness, neg workups to date. Saw rheum - they did not want to start her on prednisone due to side effects. Left hip has known OA and still with pain.\par 8/17/18: RIght hip s still some pain laterally and occasionally feels swollen. Left hip has worsening groin pain and stiffness\par 9/28/18: Continued pain, got auth for CLARIBEL but did not schedule yet. Fell 5 days ago has some pain since then.\par 3/8/19: Did not schedule surgery because she was nervous. Worsening left groin pain recently.\par 10/4/19: 3 weeks s/p left CLARIBEL having some pain - had blisters and swelling at inc site - improving with cortisone cream. Taking Tylenol for pain.\par 11/15/19: 2 months postop - lateral sensitivity and numbness, Tylenol helps.\par 1/10/20: Cont pain and stiffness in both hips. Feels ROM is very limited. Going to PT. No fevers/chills.\par 8/10/20: Patient is having shortness of breathe related to provious covid seeing Pulm Feeling numbness in left hip. Stiffness in both knees overall hip sare good but still has some stiffness\par 12/1/20: No change - went to rheum and had some more blood work done - was told she has "inflammation" and has f/u again with them in Jan.\par 4/2/21: No significant change in the hips. Patient had Covid for the second time in the beginning of march. Just saw Rheumatologist and got blood work up, still waiting on results.\par 8/3/21: Had RA flareup a couple of weeks ago - saw rheum and started Cymbalta which is helping. Getting new blood work next month with rheum.\par 11/5/21: Still some lateral pain in both hips. Groin pain resolved. Currently being treated for heel spurs which is a bigger issue for her.\par 2/4/22: She has not been able to do PT as her heel spurs flared up. She had 2 episodes of sharp pain and locking of L hip within the past few weeks.  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

## 2022-07-26 NOTE — DISCUSSION/SUMMARY
[de-identified] : The patient was advised of the diagnosis.  The natural history of the pathology was explained in full to the patient in layman's terms. All questions were answered.  The risks and benefits of surgical and non-surgical treatment alternatives were explained in full to the patient.\par

## 2022-07-26 NOTE — IMAGING
[Bilateral] : hip with pelvis bilaterally [AP] : anteroposterior [Lateral] : lateral [Components well fixed, in good position] : Components well fixed, in good position [de-identified] : Right Hip/Thigh: Range of motion of the hip is as follows: limited internal rotation and limited external rotation. \par \par Left Hip/Thigh: Inspection of the hip/thigh is as follows: Inspection shows minimal swelling. Range of motion of the hip is as follows: limited internal rotation and limited external rotation. Gait and function is as follows: ambulation with cane.

## 2022-09-20 ENCOUNTER — APPOINTMENT (OUTPATIENT)
Dept: ORTHOPEDIC SURGERY | Facility: CLINIC | Age: 52
End: 2022-09-20

## 2022-10-21 ENCOUNTER — APPOINTMENT (OUTPATIENT)
Dept: ORTHOPEDIC SURGERY | Facility: CLINIC | Age: 52
End: 2022-10-21

## 2022-10-21 VITALS — HEIGHT: 68 IN | WEIGHT: 211 LBS | BODY MASS INDEX: 31.98 KG/M2

## 2022-10-21 PROCEDURE — 99213 OFFICE O/P EST LOW 20 MIN: CPT | Mod: ACP

## 2022-10-21 PROCEDURE — 99072 ADDL SUPL MATRL&STAF TM PHE: CPT | Mod: ACP

## 2022-10-21 NOTE — HISTORY OF PRESENT ILLNESS
[Work related] : work related [7] : 7 [5] : 5 [Dull/Aching] : dull/aching [de-identified] : 10/21/22: Cont pain, spasms in both hips when sitting.\par \par Previous doc:\par WC 8/14/14. \par Patient known to me for right CLARIBEL 6/1/16 always with pain and stiffness, neg workups to date. Saw rheum - they did not want to start her on prednisone due to side effects. Left hip has known OA and still with pain.\par 8/17/18: RIght hip s still some pain laterally and occasionally feels swollen. Left hip has worsening groin pain and stiffness\par 9/28/18: Continued pain, got auth for CLARIBEL but did not schedule yet. Fell 5 days ago has some pain since then.\par 3/8/19: Did not schedule surgery because she was nervous. Worsening left groin pain recently.\par 10/4/19: 3 weeks s/p left CLARIBEL having some pain - had blisters and swelling at inc site - improving with cortisone cream. Taking Tylenol for pain.\par 11/15/19: 2 months postop - lateral sensitivity and numbness, Tylenol helps.\par 1/10/20: Cont pain and stiffness in both hips. Feels ROM is very limited. Going to PT. No fevers/chills.\par 8/10/20: Patient is having shortness of breathe related to provious covid seeing Pulm Feeling numbness in left hip. Stiffness in both knees overall hip sare good but still has some stiffness\par 12/1/20: No change - went to rheum and had some more blood work done - was told she has "inflammation" and has f/u again with them in Jan.\par 4/2/21: No significant change in the hips. Patient had Covid for the second time in the beginning of march. Just saw Rheumatologist and got blood work up, still waiting on results.\par 8/3/21: Had RA flareup a couple of weeks ago - saw rheum and started Cymbalta which is helping. Getting new blood work next month with rheum.\par 11/5/21: Still some lateral pain in both hips. Groin pain resolved. Currently being treated for heel spurs which is a bigger issue for her.\par 2/4/22: She has not been able to do PT as her heel spurs flared up. She had 2 episodes of sharp pain and locking of L hip within the past few weeks. \par 7/26/22: Having some pain lately.  Was unable to get PT appointments. [FreeTextEntry3] : 8/14/14 [FreeTextEntry6] : locking

## 2022-10-21 NOTE — IMAGING
[de-identified] : Right Hip/Thigh: Range of motion of the hip is as follows: limited internal rotation and limited external rotation. \par \par Left Hip/Thigh: Inspection of the hip/thigh is as follows: Inspection shows minimal swelling. Range of motion of the hip is as follows: limited internal rotation and limited external rotation. Gait and function is as follows: ambulation with cane.

## 2022-10-21 NOTE — ASSESSMENT
[FreeTextEntry1] : Previous doc:\par Right CLARIBEL 216 always with pain and stiffness. Left hip adv OA but hesitant to proceed with CLARIBEL as she is still struggling with her right side. Known Lspine disc disease radiating down her leg - recc spine consult. Seeing rheum working on meds. If neg Lspine eval will consider proceeding with left CLARIBEL - her right CLARIBEL has met a baseline and is functionally better than her left hip.\par 8/17/18: Neg spine eval. Left hip pain is worsening and will plan for left CLARIBEL. She still feels her right hip has some pain but has had neg workup in the past and is all lateral muscular pain. Left hip is clearly intraarticular pain in the groin which is causing her right hip muscular pain due to altered gait and chronic dysfunction. Her only option at this point is to proceed with left CLARIBEL.\par 9/28/18: Continued pain - she will call [facility] to schedule CLARIBEL. Recent fall with small contusion to right low back and buttock. \par 3/8/19: No change on XR - will plan for CLARIBEL.\par 10/4 doing ok had some blisters - but no sign of infection - she is doing OK - work on ROM\par 11/15 doing well - still feels heavy and stiff- this is doing well for her - she is improving - I hope that the reason her rt side had such slow recovery due to stiffness in left and feel with her PT now she will get better with both sides\par 1/10/20: Cont pain and stiffness in both hips and she is frustrated by her lack of function. I feel her ongoing difficulty is related to the severity of her preop function and recc cont PT and add Celebrex. Reeval in 6 weeks.\par 8/10/20: ROM is improving. Still feeling stiffness in both hips . Recommended HEP to cont improvement of ROM. Cannot continue PT due to shortness of breathe. Follow up in 3 months. overall functionally she is better and walking better but stil lwith sig disability- I am concerned she may have some other underlying condition for hior hips and lungs- recommend Rh again\par 12/1/20: No change - still having difficulty with pulm disease and is seeing rheum again next month. Cannot do PT at this point because of her lungs. she will bring lab results from rheum\par 4/2/21: overall significantly better motion in the hips but But not within the normal range. still has pain radiating down her sides, may be rheumatological vs her lower back. Was given PT script by rheumatologist. Will bring in lab results at next visit.\par 8/3/21: Recent episode of pain - being treated by rheum with Cymbalta and PT, getting new blood work next month.\par 11/5/21: Groin pain has resolved - still with lateral pain. She had blood work done with rheum. We will try some formal PT to work on her lateral hip pain as this has helped in the past.\par 2/4/22: Flare up of her foot pain is causing some increased hip pain. She will return to PT once her foot pain has resolved. Rx for aquatherapy. f/up in 3 months.\par 7/26/22: Some lateral hip and low back pain, rarely in groin.  Again recc PT for this to improve ROM.  Cannot take NSAIDs due to asthma and motrin allergy.  She is going to try to get PT from her rheumatologist as this has been easier for her in the past.\par \par 10/21/22: No real change - again recc PT.  I feel a lot of her symptoms are related to msucular spasm and has known Lspine issues and she was offered spine surgery for this in the past.  Will restart some PT and send to spine for reeval.  I do not see a mechanical hip issue at this point.

## 2022-10-21 NOTE — WORK
[Total] : total [Does not reveal pre-existing condition(s) that may affect treatment/prognosis] : does not reveal pre-existing condition(s) that may affect treatment/prognosis [Cannot return to work because ________] : cannot return to work because [unfilled] [Unknown at this time] : : unknown at this time [Patient] : patient [No Rx restrictions] : No Rx restrictions. [I provided the services listed above] :  I provided the services listed above. [FreeTextEntry1] : Guarded

## 2022-10-21 NOTE — DISCUSSION/SUMMARY
[de-identified] : The patient was advised of the diagnosis.  The natural history of the pathology was explained in full to the patient in layman's terms. All questions were answered.  The risks and benefits of surgical and non-surgical treatment alternatives were explained in full to the patient.\par

## 2023-02-03 ENCOUNTER — APPOINTMENT (OUTPATIENT)
Dept: ORTHOPEDIC SURGERY | Facility: CLINIC | Age: 53
End: 2023-02-03
Payer: OTHER MISCELLANEOUS

## 2023-02-03 VITALS — BODY MASS INDEX: 31.61 KG/M2 | WEIGHT: 211 LBS | HEIGHT: 68.5 IN

## 2023-02-03 PROCEDURE — 99213 OFFICE O/P EST LOW 20 MIN: CPT

## 2023-02-03 PROCEDURE — 99072 ADDL SUPL MATRL&STAF TM PHE: CPT

## 2023-02-03 NOTE — ASSESSMENT
[FreeTextEntry1] : Previous doc:\par Right CLARIBEL 216 always with pain and stiffness. Left hip adv OA but hesitant to proceed with CLARIBEL as she is still struggling with her right side. Known Lspine disc disease radiating down her leg - recc spine consult. Seeing rheum working on meds. If neg Lspine eval will consider proceeding with left CLARIBEL - her right CLARIBEL has met a baseline and is functionally better than her left hip.\par 8/17/18: Neg spine eval. Left hip pain is worsening and will plan for left CLARIBEL. She still feels her right hip has some pain but has had neg workup in the past and is all lateral muscular pain. Left hip is clearly intraarticular pain in the groin which is causing her right hip muscular pain due to altered gait and chronic dysfunction. Her only option at this point is to proceed with left CLARIBEL.\par 9/28/18: Continued pain - she will call [facility] to schedule CLARIBEL. Recent fall with small contusion to right low back and buttock. \par 3/8/19: No change on XR - will plan for CLARIBEL.\par 10/4 doing ok had some blisters - but no sign of infection - she is doing OK - work on ROM\par 11/15 doing well - still feels heavy and stiff- this is doing well for her - she is improving - I hope that the reason her rt side had such slow recovery due to stiffness in left and feel with her PT now she will get better with both sides\par 1/10/20: Cont pain and stiffness in both hips and she is frustrated by her lack of function. I feel her ongoing difficulty is related to the severity of her preop function and recc cont PT and add Celebrex. Reeval in 6 weeks.\par 8/10/20: ROM is improving. Still feeling stiffness in both hips . Recommended HEP to cont improvement of ROM. Cannot continue PT due to shortness of breathe. Follow up in 3 months. overall functionally she is better and walking better but stil lwith sig disability- I am concerned she may have some other underlying condition for hior hips and lungs- recommend Rh again\par 12/1/20: No change - still having difficulty with pulm disease and is seeing rheum again next month. Cannot do PT at this point because of her lungs. she will bring lab results from rheum\par 4/2/21: overall significantly better motion in the hips but But not within the normal range. still has pain radiating down her sides, may be rheumatological vs her lower back. Was given PT script by rheumatologist. Will bring in lab results at next visit.\par 8/3/21: Recent episode of pain - being treated by rheum with Cymbalta and PT, getting new blood work next month.\par 11/5/21: Groin pain has resolved - still with lateral pain. She had blood work done with rheum. We will try some formal PT to work on her lateral hip pain as this has helped in the past.\par 2/4/22: Flare up of her foot pain is causing some increased hip pain. She will return to PT once her foot pain has resolved. Rx for aquatherapy. f/up in 3 months.\par 7/26/22: Some lateral hip and low back pain, rarely in groin.  Again recc PT for this to improve ROM.  Cannot take NSAIDs due to asthma and motrin allergy.  She is going to try to get PT from her rheumatologist as this has been easier for her in the past.\par \par 10/21/22: No real change - again recc PT.  I feel a lot of her symptoms are related to msucular spasm and has known Lspine issues and she was offered spine surgery for this in the past.  Will restart some PT and send to spine for reeval.  I do not see a mechanical hip issue at this point.\par \par 2/3/23: No change - she has not been able to do any PT since last time due to illnesses in her family that she had to deal with.  Cont HEP and will return to PT when she is able.

## 2023-02-03 NOTE — DISCUSSION/SUMMARY
[de-identified] : The patient was advised of the diagnosis.  The natural history of the pathology was explained in full to the patient in layman's terms. All questions were answered.  The risks and benefits of surgical and non-surgical treatment alternatives were explained in full to the patient.\par

## 2023-02-03 NOTE — HISTORY OF PRESENT ILLNESS
[Work related] : work related [7] : 7 [Dull/Aching] : dull/aching [Sharp] : sharp [Shooting] : shooting [Stabbing] : stabbing [Throbbing] : throbbing [Constant] : constant [Retired] : Work status: retired [] : yes [de-identified] : 2/3/23: No change, has not been able to do PT.\par \par Previous doc:\par WC 8/14/14. \par Patient known to me for right CLARIBEL 6/1/16 always with pain and stiffness, neg workups to date. Saw rheum - they did not want to start her on prednisone due to side effects. Left hip has known OA and still with pain.\par 8/17/18: RIght hip s still some pain laterally and occasionally feels swollen. Left hip has worsening groin pain and stiffness\par 9/28/18: Continued pain, got auth for CLARIBEL but did not schedule yet. Fell 5 days ago has some pain since then.\par 3/8/19: Did not schedule surgery because she was nervous. Worsening left groin pain recently.\par 10/4/19: 3 weeks s/p left CLARIBEL having some pain - had blisters and swelling at inc site - improving with cortisone cream. Taking Tylenol for pain.\par 11/15/19: 2 months postop - lateral sensitivity and numbness, Tylenol helps.\par 1/10/20: Cont pain and stiffness in both hips. Feels ROM is very limited. Going to PT. No fevers/chills.\par 8/10/20: Patient is having shortness of breathe related to provious covid seeing Pulm Feeling numbness in left hip. Stiffness in both knees overall hip sare good but still has some stiffness\par 12/1/20: No change - went to rheum and had some more blood work done - was told she has "inflammation" and has f/u again with them in Jan.\par 4/2/21: No significant change in the hips. Patient had Covid for the second time in the beginning of march. Just saw Rheumatologist and got blood work up, still waiting on results.\par 8/3/21: Had RA flareup a couple of weeks ago - saw rheum and started Cymbalta which is helping. Getting new blood work next month with rheum.\par 11/5/21: Still some lateral pain in both hips. Groin pain resolved. Currently being treated for heel spurs which is a bigger issue for her.\par 2/4/22: She has not been able to do PT as her heel spurs flared up. She had 2 episodes of sharp pain and locking of L hip within the past few weeks. \par 7/26/22: Having some pain lately.  Was unable to get PT appointments.\par 10/21/22: Cont pain, spasms in both hips when sitting. [FreeTextEntry1] : ERICK hips [FreeTextEntry3] : 8/14/14 [FreeTextEntry5] : JOHNATHON VEGAS is a 52 year old F here for a follow up for ERICK hip pain. Pt states that she's doing about the same since her last visit.  [FreeTextEntry7] : Pain radiates into groin and back

## 2023-02-03 NOTE — IMAGING
[de-identified] : Right Hip/Thigh: Range of motion of the hip is as follows: limited internal rotation and limited external rotation. \par \par Left Hip/Thigh: Inspection of the hip/thigh is as follows: Inspection shows minimal swelling. Range of motion of the hip is as follows: limited internal rotation and limited external rotation. Gait and function is as follows: ambulation with cane.

## 2023-03-22 NOTE — ED CDU PROVIDER SUBSEQUENT DAY NOTE - [facility]
Call her at [phone]  Discuss reason why rx is lowered  And split levothyroxine?    Call by [phone]   Or leave her a message      Anxiety    Asthma

## 2023-05-05 ENCOUNTER — APPOINTMENT (OUTPATIENT)
Dept: ORTHOPEDIC SURGERY | Facility: CLINIC | Age: 53
End: 2023-05-05

## 2023-05-23 ENCOUNTER — APPOINTMENT (OUTPATIENT)
Dept: ORTHOPEDIC SURGERY | Facility: CLINIC | Age: 53
End: 2023-05-23
Payer: OTHER MISCELLANEOUS

## 2023-05-23 DIAGNOSIS — M19.90 UNSPECIFIED OSTEOARTHRITIS, UNSPECIFIED SITE: ICD-10-CM

## 2023-05-23 DIAGNOSIS — J44.9 CHRONIC OBSTRUCTIVE PULMONARY DISEASE, UNSPECIFIED: ICD-10-CM

## 2023-05-23 DIAGNOSIS — J45.909 UNSPECIFIED ASTHMA, UNCOMPLICATED: ICD-10-CM

## 2023-05-23 PROCEDURE — 99213 OFFICE O/P EST LOW 20 MIN: CPT

## 2023-05-23 NOTE — DISCUSSION/SUMMARY
[de-identified] : The patient was advised of the diagnosis.  The natural history of the pathology was explained in full to the patient in layman's terms. All questions were answered.  The risks and benefits of surgical and non-surgical treatment alternatives were explained in full to the patient.\par

## 2023-05-23 NOTE — WORK
[Does not reveal pre-existing condition(s) that may affect treatment/prognosis] : does not reveal pre-existing condition(s) that may affect treatment/prognosis [Cannot return to work because ________] : cannot return to work because [unfilled] [Unknown at this time] : : unknown at this time [Patient] : patient [No Rx restrictions] : No Rx restrictions. [I provided the services listed above] :  I provided the services listed above. [Total (100%)] : total (100%) [FreeTextEntry1] : Guarded

## 2023-05-23 NOTE — HISTORY OF PRESENT ILLNESS
[Work related] : work related [7] : 7 [Dull/Aching] : dull/aching [Sharp] : sharp [Shooting] : shooting [Stabbing] : stabbing [Throbbing] : throbbing [Constant] : constant [Retired] : Work status: retired [] : yes [de-identified] : \par \par Previous doc:\par WC 8/14/14. \par Patient known to me for right CLARIBEL 6/1/16 always with pain and stiffness, neg workups to date. Saw rheum - they did not want to start her on prednisone due to side effects. Left hip has known OA and still with pain.\par 8/17/18: RIght hip s still some pain laterally and occasionally feels swollen. Left hip has worsening groin pain and stiffness\par 9/28/18: Continued pain, got auth for CLARIBEL but did not schedule yet. Fell 5 days ago has some pain since then.\par 3/8/19: Did not schedule surgery because she was nervous. Worsening left groin pain recently.\par 10/4/19: 3 weeks s/p left CLARIBEL having some pain - had blisters and swelling at inc site - improving with cortisone cream. Taking Tylenol for pain.\par 11/15/19: 2 months postop - lateral sensitivity and numbness, Tylenol helps.\par 1/10/20: Cont pain and stiffness in both hips. Feels ROM is very limited. Going to PT. No fevers/chills.\par 8/10/20: Patient is having shortness of breathe related to provious covid seeing Pulm Feeling numbness in left hip. Stiffness in both knees overall hip sare good but still has some stiffness\par 12/1/20: No change - went to rheum and had some more blood work done - was told she has "inflammation" and has f/u again with them in Jan.\par 4/2/21: No significant change in the hips. Patient had Covid for the second time in the beginning of march. Just saw Rheumatologist and got blood work up, still waiting on results.\par 8/3/21: Had RA flareup a couple of weeks ago - saw rheum and started Cymbalta which is helping. Getting new blood work next month with rheum.\par 11/5/21: Still some lateral pain in both hips. Groin pain resolved. Currently being treated for heel spurs which is a bigger issue for her.\par 2/4/22: She has not been able to do PT as her heel spurs flared up. She had 2 episodes of sharp pain and locking of L hip within the past few weeks. \par 7/26/22: Having some pain lately.  Was unable to get PT appointments.\par 10/21/22: Cont pain, spasms in both hips when sitting.\par 2/3/23: No change, has not been able to do PT. [FreeTextEntry1] : ERICK hips [FreeTextEntry3] : 8/14/14 [FreeTextEntry5] : JOHNATHON VEGAS is a 52 year old F here for a follow up for ERICK hip pain. Pain radiating into buttocks and feet . [FreeTextEntry7] : Pain radiates into groin and back

## 2023-05-23 NOTE — ASSESSMENT
[FreeTextEntry1] : Previous doc:\par Right CLARIBEL 216 always with pain and stiffness. Left hip adv OA but hesitant to proceed with CLARIBEL as she is still struggling with her right side. Known Lspine disc disease radiating down her leg - recc spine consult. Seeing rheum working on meds. If neg Lspine eval will consider proceeding with left CLARIBEL - her right CLARIBEL has met a baseline and is functionally better than her left hip.\par 8/17/18: Neg spine eval. Left hip pain is worsening and will plan for left CLARIBEL. She still feels her right hip has some pain but has had neg workup in the past and is all lateral muscular pain. Left hip is clearly intraarticular pain in the groin which is causing her right hip muscular pain due to altered gait and chronic dysfunction. Her only option at this point is to proceed with left CLARIBEL.\par 9/28/18: Continued pain - she will call [facility] to schedule CLARIBEL. Recent fall with small contusion to right low back and buttock. \par 3/8/19: No change on XR - will plan for CLARIBEL.\par 10/4 doing ok had some blisters - but no sign of infection - she is doing OK - work on ROM\par 11/15 doing well - still feels heavy and stiff- this is doing well for her - she is improving - I hope that the reason her rt side had such slow recovery due to stiffness in left and feel with her PT now she will get better with both sides\par 1/10/20: Cont pain and stiffness in both hips and she is frustrated by her lack of function. I feel her ongoing difficulty is related to the severity of her preop function and recc cont PT and add Celebrex. Reeval in 6 weeks.\par 8/10/20: ROM is improving. Still feeling stiffness in both hips . Recommended HEP to cont improvement of ROM. Cannot continue PT due to shortness of breathe. Follow up in 3 months. overall functionally she is better and walking better but stil lwith sig disability- I am concerned she may have some other underlying condition for hior hips and lungs- recommend Rh again\par 12/1/20: No change - still having difficulty with pulm disease and is seeing rheum again next month. Cannot do PT at this point because of her lungs. she will bring lab results from rheum\par 4/2/21: overall significantly better motion in the hips but But not within the normal range. still has pain radiating down her sides, may be rheumatological vs her lower back. Was given PT script by rheumatologist. Will bring in lab results at next visit.\par 8/3/21: Recent episode of pain - being treated by rheum with Cymbalta and PT, getting new blood work next month.\par 11/5/21: Groin pain has resolved - still with lateral pain. She had blood work done with rheum. We will try some formal PT to work on her lateral hip pain as this has helped in the past.\par 2/4/22: Flare up of her foot pain is causing some increased hip pain. She will return to PT once her foot pain has resolved. Rx for aquatherapy. f/up in 3 months.\par 7/26/22: Some lateral hip and low back pain, rarely in groin.  Again recc PT for this to improve ROM.  Cannot take NSAIDs due to asthma and motrin allergy.  She is going to try to get PT from her rheumatologist as this has been easier for her in the past.\par 10/21/22: No real change - again recc PT.  I feel a lot of her symptoms are related to msucular spasm and has known Lspine issues and she was offered spine surgery for this in the past.  Will restart some PT and send to spine for reeval.  I do not see a mechanical hip issue at this point.\par 2/3/23: No change - she has not been able to do any PT since last time due to illnesses in her family that she had to deal with.  Cont HEP and will return to PT when she is able.\par \par 5/2323: Still with pain after activity throughout both legs.  Takes NSAIDs which help.  She has not been able to do formal PT as she is caring for her father who is ill.  Cont HEP and activity as tolerated for now.

## 2023-05-23 NOTE — IMAGING
[de-identified] : Right Hip/Thigh: Range of motion of the hip is as follows: limited internal rotation and limited external rotation. \par \par Left Hip/Thigh: Inspection of the hip/thigh is as follows: Inspection shows minimal swelling. Range of motion of the hip is as follows: limited internal rotation and limited external rotation. Gait and function is as follows: ambulation with cane.

## 2023-09-26 ENCOUNTER — APPOINTMENT (OUTPATIENT)
Dept: ORTHOPEDIC SURGERY | Facility: CLINIC | Age: 53
End: 2023-09-26

## 2024-03-01 ENCOUNTER — APPOINTMENT (OUTPATIENT)
Dept: ORTHOPEDIC SURGERY | Facility: CLINIC | Age: 54
End: 2024-03-01
Payer: OTHER MISCELLANEOUS

## 2024-03-01 VITALS — WEIGHT: 211 LBS | HEIGHT: 68.5 IN | BODY MASS INDEX: 31.61 KG/M2

## 2024-03-01 DIAGNOSIS — M22.42 CHONDROMALACIA PATELLAE, LEFT KNEE: ICD-10-CM

## 2024-03-01 DIAGNOSIS — Z96.643 PRESENCE OF ARTIFICIAL HIP JOINT, BILATERAL: ICD-10-CM

## 2024-03-01 DIAGNOSIS — M16.0 BILATERAL PRIMARY OSTEOARTHRITIS OF HIP: ICD-10-CM

## 2024-03-01 DIAGNOSIS — M22.41 CHONDROMALACIA PATELLAE, RIGHT KNEE: ICD-10-CM

## 2024-03-01 PROCEDURE — 20611 DRAIN/INJ JOINT/BURSA W/US: CPT | Mod: 50

## 2024-03-01 PROCEDURE — 73562 X-RAY EXAM OF KNEE 3: CPT | Mod: 50

## 2024-03-01 PROCEDURE — J3490M: CUSTOM

## 2024-03-01 PROCEDURE — 99214 OFFICE O/P EST MOD 30 MIN: CPT | Mod: 25

## 2024-03-01 NOTE — IMAGING
[de-identified] : Right Hip/Thigh: Range of motion of the hip is as follows: limited internal rotation and limited external rotation.   Left Hip/Thigh: Inspection of the hip/thigh is as follows: Inspection shows minimal swelling. Range of motion of the hip is as follows: limited internal rotation and limited external rotation. Gait and function is as follows: ambulation with cane.  Right knee: No effusion.  Ant and medial tenderness.  Left knee: No effusion.  Ant and medial tenderness.

## 2024-03-01 NOTE — PROCEDURE
[FreeTextEntry3] : Large joint injection was performed on both knees. The indication for this procedure was pain, inflammation, and x-ray evidence of Osteoarthritis on this or prior visit. The site was prepped with betadine, ethyl chloride sprayed topically, and sterile technique used. An injection of Lidocaine 3cc of 1% , Bupivacaine (Marcaine) 5cc of 0.25% , Methylprednisolone (Depomedrol) cc of 80 mg was used. Patient was advised to call if redness, pain, or fever occur, apply ice for 15 minutes out of every hour for the next 12-24 hours as tolerated and patient was advised to rest the joint(s) for days. Patient has tried OTC's including aspirin, Ibuprofen, Aleve, etc or prescription NSAIDS, and/or exercises at home and/or physical therapy without satisfactory response and patient had decreased mobility in the joint. Ultrasound guidance was indicated for this patient due to better visualize joint space. All ultrasound images have been permanently captured and stored accordingly in our picture.

## 2024-03-01 NOTE — WORK
[Total (100%)] : total (100%) [Does not reveal pre-existing condition(s) that may affect treatment/prognosis] : does not reveal pre-existing condition(s) that may affect treatment/prognosis [Cannot return to work because ________] : cannot return to work because [unfilled] [Unknown at this time] : : unknown at this time [Patient] : patient [No Rx restrictions] : No Rx restrictions. [I provided the services listed above] :  I provided the services listed above. [FreeTextEntry1] : Guarded

## 2024-03-01 NOTE — ASSESSMENT
[FreeTextEntry1] : Previous doc: Right CLARIBEL 216 always with pain and stiffness. Left hip adv OA but hesitant to proceed with CLARIBEL as she is still struggling with her right side. Known Lspine disc disease radiating down her leg - recc spine consult. Seeing rheum working on meds. If neg Lspine eval will consider proceeding with left CLARIBEL - her right CLARIBEL has met a baseline and is functionally better than her left hip. 8/17/18: Neg spine eval. Left hip pain is worsening and will plan for left CLARIBEL. She still feels her right hip has some pain but has had neg workup in the past and is all lateral muscular pain. Left hip is clearly intraarticular pain in the groin which is causing her right hip muscular pain due to altered gait and chronic dysfunction. Her only option at this point is to proceed with left CLARIBEL. 9/28/18: Continued pain - she will call [facility] to schedule CLARIBEL. Recent fall with small contusion to right low back and buttock.  3/8/19: No change on XR - will plan for CLARIBEL. 10/4 doing ok had some blisters - but no sign of infection - she is doing OK - work on ROM 11/15 doing well - still feels heavy and stiff- this is doing well for her - she is improving - I hope that the reason her rt side had such slow recovery due to stiffness in left and feel with her PT now she will get better with both sides 1/10/20: Cont pain and stiffness in both hips and she is frustrated by her lack of function. I feel her ongoing difficulty is related to the severity of her preop function and recc cont PT and add Celebrex. Reeval in 6 weeks. 8/10/20: ROM is improving. Still feeling stiffness in both hips . Recommended HEP to cont improvement of ROM. Cannot continue PT due to shortness of breathe. Follow up in 3 months. overall functionally she is better and walking better but stil lwith sig disability- I am concerned she may have some other underlying condition for hior hips and lungs- recommend Rh again 12/1/20: No change - still having difficulty with pulm disease and is seeing rheum again next month. Cannot do PT at this point because of her lungs. she will bring lab results from rheum 4/2/21: overall significantly better motion in the hips but But not within the normal range. still has pain radiating down her sides, may be rheumatological vs her lower back. Was given PT script by rheumatologist. Will bring in lab results at next visit. 8/3/21: Recent episode of pain - being treated by rheum with Cymbalta and PT, getting new blood work next month. 11/5/21: Groin pain has resolved - still with lateral pain. She had blood work done with rheum. We will try some formal PT to work on her lateral hip pain as this has helped in the past. 2/4/22: Flare up of her foot pain is causing some increased hip pain. She will return to PT once her foot pain has resolved. Rx for aquatherapy. f/up in 3 months. 7/26/22: Some lateral hip and low back pain, rarely in groin.  Again recc PT for this to improve ROM.  Cannot take NSAIDs due to asthma and motrin allergy.  She is going to try to get PT from her rheumatologist as this has been easier for her in the past. 10/21/22: No real change - again recc PT.  I feel a lot of her symptoms are related to msucular spasm and has known Lspine issues and she was offered spine surgery for this in the past.  Will restart some PT and send to spine for reeval.  I do not see a mechanical hip issue at this point. 2/3/23: No change - she has not been able to do any PT since last time due to illnesses in her family that she had to deal with.  Cont HEP and will return to PT when she is able. 5/2323: Still with pain after activity throughout both legs.  Takes NSAIDs which help.  She has not been able to do formal PT as she is caring for her father who is ill.  Cont HEP and activity as tolerated for now.  3/1/24: No change with hips and Lspine - she is planning to start aqua PT shortly.  She overall has mult joint pains from lack of mobility and would benefit from PT for this.  Both knees with PF pain - no significant XR changes - will try cortisone both knees and if no improvement get MRIs.  Had HA injections 2019 without relief.

## 2024-03-01 NOTE — HISTORY OF PRESENT ILLNESS
[7] : 7 [de-identified] : 3/1/24: No real change.  Has rx from her other doctor to start [facility].  Concerned about worsening knee pain and buckling - knees are part of this  injury - had injections in 2019 without relief but has not had any eval of knees since.  Previous doc:  8/14/14.  Patient known to me for right CLARIBEL 6/1/16 always with pain and stiffness, neg workups to date. Saw rheum - they did not want to start her on prednisone due to side effects. Left hip has known OA and still with pain. 8/17/18: RIght hip s still some pain laterally and occasionally feels swollen. Left hip has worsening groin pain and stiffness 9/28/18: Continued pain, got auth for CLRAIBEL but did not schedule yet. Fell 5 days ago has some pain since then. 3/8/19: Did not schedule surgery because she was nervous. Worsening left groin pain recently. 10/4/19: 3 weeks s/p left CLARIBEL having some pain - had blisters and swelling at inc site - improving with cortisone cream. Taking Tylenol for pain. 11/15/19: 2 months postop - lateral sensitivity and numbness, Tylenol helps. 1/10/20: Cont pain and stiffness in both hips. Feels ROM is very limited. Going to PT. No fevers/chills. 8/10/20: Patient is having shortness of breathe related to provious covid seeing Pulm Feeling numbness in left hip. Stiffness in both knees overall hip sare good but still has some stiffness 12/1/20: No change - went to rheum and had some more blood work done - was told she has "inflammation" and has f/u again with them in Jan. 4/2/21: No significant change in the hips. Patient had Covid for the second time in the beginning of march. Just saw Rheumatologist and got blood work up, still waiting on results. 8/3/21: Had RA flareup a couple of weeks ago - saw rheum and started Cymbalta which is helping. Getting new blood work next month with rheum. 11/5/21: Still some lateral pain in both hips. Groin pain resolved. Currently being treated for heel spurs which is a bigger issue for her. 2/4/22: She has not been able to do PT as her heel spurs flared up. She had 2 episodes of sharp pain and locking of L hip within the past few weeks.  7/26/22: Having some pain lately.  Was unable to get PT appointments. 10/21/22: Cont pain, spasms in both hips when sitting. 2/3/23: No change, has not been able to do PT.

## 2024-03-01 NOTE — DISCUSSION/SUMMARY
[de-identified] : The patient was advised of the diagnosis.  The natural history of the pathology was explained in full to the patient in layman's terms. All questions were answered.  The risks and benefits of surgical and non-surgical treatment alternatives were explained in full to the patient.

## 2024-05-13 NOTE — ED ADULT NURSE NOTE - BP NONINVASIVE DIASTOLIC (MM HG)
Problem: Comorbidity Management  Goal: Maintenance of COPD Symptom Control  Outcome: Progressing  Goal: Blood Pressure in Desired Range  Outcome: Progressing     Problem: Dysrhythmia  Goal: Normalized Cardiac Rhythm  Outcome: Progressing     Problem: Pulmonary Impairment  Goal: Improved Activity Tolerance  Outcome: Progressing  Goal: Effective Airway Clearance  Outcome: Progressing  Goal: Optimal Gas Exchange  Outcome: Progressing     Problem: Skin or Soft Tissue Infection  Goal: Absence of Infection Signs and Symptoms  Outcome: Progressing     Problem: UTI (Urinary Tract Infection)  Goal: Improved Infection Symptoms  Outcome: Progressing     Problem: Urinary Retention  Goal: Effective Urinary Elimination  Outcome: Progressing     Problem: Electrolyte Imbalance  Goal: Electrolyte Balance  Outcome: Progressing      64

## 2024-07-16 ENCOUNTER — APPOINTMENT (OUTPATIENT)
Dept: ORTHOPEDIC SURGERY | Facility: CLINIC | Age: 54
End: 2024-07-16
Payer: OTHER MISCELLANEOUS

## 2024-07-16 VITALS — BODY MASS INDEX: 31.61 KG/M2 | HEIGHT: 68.5 IN | WEIGHT: 211 LBS

## 2024-07-16 DIAGNOSIS — M22.42 CHONDROMALACIA PATELLAE, LEFT KNEE: ICD-10-CM

## 2024-07-16 DIAGNOSIS — M16.0 BILATERAL PRIMARY OSTEOARTHRITIS OF HIP: ICD-10-CM

## 2024-07-16 DIAGNOSIS — M22.41 CHONDROMALACIA PATELLAE, RIGHT KNEE: ICD-10-CM

## 2024-07-16 DIAGNOSIS — Z96.643 PRESENCE OF ARTIFICIAL HIP JOINT, BILATERAL: ICD-10-CM

## 2024-07-16 PROCEDURE — 99213 OFFICE O/P EST LOW 20 MIN: CPT

## 2024-10-25 ENCOUNTER — APPOINTMENT (OUTPATIENT)
Dept: ORTHOPEDIC SURGERY | Facility: CLINIC | Age: 54
End: 2024-10-25
Payer: OTHER MISCELLANEOUS

## 2024-10-25 VITALS — WEIGHT: 216 LBS | BODY MASS INDEX: 32.74 KG/M2 | HEIGHT: 68 IN

## 2024-10-25 DIAGNOSIS — M16.0 BILATERAL PRIMARY OSTEOARTHRITIS OF HIP: ICD-10-CM

## 2024-10-25 DIAGNOSIS — Z96.643 PRESENCE OF ARTIFICIAL HIP JOINT, BILATERAL: ICD-10-CM

## 2024-10-25 PROCEDURE — 99213 OFFICE O/P EST LOW 20 MIN: CPT

## 2024-11-27 ENCOUNTER — APPOINTMENT (OUTPATIENT)
Dept: VASCULAR SURGERY | Facility: CLINIC | Age: 54
End: 2024-11-27

## 2024-12-18 ENCOUNTER — APPOINTMENT (OUTPATIENT)
Dept: VASCULAR SURGERY | Facility: CLINIC | Age: 54
End: 2024-12-18
Payer: MEDICARE

## 2024-12-18 VITALS
DIASTOLIC BLOOD PRESSURE: 79 MMHG | RESPIRATION RATE: 16 BRPM | TEMPERATURE: 97.8 F | WEIGHT: 213 LBS | HEART RATE: 84 BPM | SYSTOLIC BLOOD PRESSURE: 136 MMHG | BODY MASS INDEX: 32.28 KG/M2 | HEIGHT: 68 IN

## 2024-12-18 DIAGNOSIS — Z78.9 OTHER SPECIFIED HEALTH STATUS: ICD-10-CM

## 2024-12-18 PROCEDURE — 93970 EXTREMITY STUDY: CPT

## 2024-12-18 PROCEDURE — 99203 OFFICE O/P NEW LOW 30 MIN: CPT

## 2024-12-18 RX ORDER — PREDNISOLONE SODIUM PHOSPHATE 10 MG/1
10 TABLET, ORALLY DISINTEGRATING ORAL
Refills: 0 | Status: ACTIVE | COMMUNITY

## 2024-12-18 RX ORDER — BUPROPION HYDROCHLORIDE 300 MG/1
300 TABLET, EXTENDED RELEASE ORAL
Refills: 0 | Status: ACTIVE | COMMUNITY

## 2024-12-18 RX ORDER — FLUTICASONE FUROATE AND VILANTEROL TRIFENATATE 100; 25 UG/1; UG/1
100-25 POWDER RESPIRATORY (INHALATION)
Refills: 0 | Status: ACTIVE | COMMUNITY

## 2024-12-18 RX ORDER — CLONAZEPAM 0.5 MG/1
0.5 TABLET ORAL
Refills: 0 | Status: ACTIVE | COMMUNITY

## 2024-12-18 RX ORDER — ALBUTEROL SULFATE 90 UG/1
INHALANT RESPIRATORY (INHALATION)
Refills: 0 | Status: ACTIVE | COMMUNITY

## 2024-12-18 RX ORDER — ARIPIPRAZOLE 2 MG/1
2 TABLET ORAL
Refills: 0 | Status: ACTIVE | COMMUNITY

## 2024-12-18 RX ORDER — LAMOTRIGINE 25 MG/1
25 TABLET ORAL
Refills: 0 | Status: ACTIVE | COMMUNITY

## 2025-02-07 ENCOUNTER — APPOINTMENT (OUTPATIENT)
Dept: ORTHOPEDIC SURGERY | Facility: CLINIC | Age: 55
End: 2025-02-07
Payer: OTHER MISCELLANEOUS

## 2025-02-07 ENCOUNTER — RESULT CHARGE (OUTPATIENT)
Age: 55
End: 2025-02-07

## 2025-02-07 VITALS — WEIGHT: 213 LBS | HEIGHT: 68 IN | BODY MASS INDEX: 32.28 KG/M2

## 2025-02-07 DIAGNOSIS — Z96.643 PRESENCE OF ARTIFICIAL HIP JOINT, BILATERAL: ICD-10-CM

## 2025-02-07 DIAGNOSIS — M22.41 CHONDROMALACIA PATELLAE, RIGHT KNEE: ICD-10-CM

## 2025-02-07 DIAGNOSIS — M22.42 CHONDROMALACIA PATELLAE, LEFT KNEE: ICD-10-CM

## 2025-02-07 DIAGNOSIS — M16.0 BILATERAL PRIMARY OSTEOARTHRITIS OF HIP: ICD-10-CM

## 2025-02-07 PROCEDURE — 99213 OFFICE O/P EST LOW 20 MIN: CPT

## 2025-02-07 PROCEDURE — 73502 X-RAY EXAM HIP UNI 2-3 VIEWS: CPT

## 2025-03-10 ENCOUNTER — APPOINTMENT (OUTPATIENT)
Dept: ORTHOPEDIC SURGERY | Facility: CLINIC | Age: 55
End: 2025-03-10
Payer: OTHER MISCELLANEOUS

## 2025-03-10 DIAGNOSIS — M54.16 RADICULOPATHY, LUMBAR REGION: ICD-10-CM

## 2025-03-10 DIAGNOSIS — M51.26 OTHER INTERVERTEBRAL DISC DISPLACEMENT, LUMBAR REGION: ICD-10-CM

## 2025-03-10 PROCEDURE — 72170 X-RAY EXAM OF PELVIS: CPT

## 2025-03-10 PROCEDURE — 99244 OFF/OP CNSLTJ NEW/EST MOD 40: CPT

## 2025-03-10 PROCEDURE — 72110 X-RAY EXAM L-2 SPINE 4/>VWS: CPT
